# Patient Record
Sex: MALE | Race: WHITE | NOT HISPANIC OR LATINO | Employment: OTHER | URBAN - NONMETROPOLITAN AREA
[De-identification: names, ages, dates, MRNs, and addresses within clinical notes are randomized per-mention and may not be internally consistent; named-entity substitution may affect disease eponyms.]

---

## 2017-01-09 ENCOUNTER — CLINICAL SUPPORT (OUTPATIENT)
Dept: AUDIOLOGY | Facility: CLINIC | Age: 52
End: 2017-01-09

## 2017-01-09 DIAGNOSIS — Z46.1 ENCOUNTER FOR FITTING OR ADJUSTMENT OF HEARING AID: Primary | ICD-10-CM

## 2017-01-09 PROCEDURE — V5267 HEARING AID SUP/ACCESS/DEV: HCPCS | Performed by: AUDIOLOGIST

## 2017-01-09 PROCEDURE — V5266 BATTERY FOR HEARING DEVICE: HCPCS | Performed by: AUDIOLOGIST

## 2017-02-22 ENCOUNTER — CLINICAL SUPPORT (OUTPATIENT)
Dept: AUDIOLOGY | Facility: CLINIC | Age: 52
End: 2017-02-22

## 2017-02-22 DIAGNOSIS — H90.3 SENSORINEURAL HEARING LOSS, ASYMMETRICAL: Primary | ICD-10-CM

## 2017-02-22 PROCEDURE — V5267 HEARING AID SUP/ACCESS/DEV: HCPCS | Performed by: AUDIOLOGIST

## 2017-02-22 PROCEDURE — V5266 BATTERY FOR HEARING DEVICE: HCPCS | Performed by: AUDIOLOGIST

## 2017-02-23 NOTE — PROGRESS NOTES
HEARING AID CHECK    Name:  Ashwin Robles  :  1965  Age:  52 y.o.  Date of Evaluation:  2017      HISTORY    Reason for visit:  Ashwin Robles is seen today for a hearing aid check.  Patient reports his hearing aid is acting up again.  He just picked up his hearing aid from repair, and 2 hours later it spontaneously quit working.      Hearing aid history:  Patient is currently wearing a [place] (ITC) hearing aid in left ear(s).      OFFICE VISIT    During today's visit the aid appeared to be in good working condition.  However, due to the intermittent behavior described by the patient, it was decided to send the hearing aid back to [facility] for them to look at it again and call with a diagnosis.      At this time he also picked up hearing aid batteries and wax traps which should be billed to his Worker's Compensation.      It was a pleasure seeing Ashwin Robles in Audiology today.  It is a pleasure helping Mr. Robles with his amplification needs.             This document has been electronically signed by Kaitlynn Alaniz MS CCC-A on 2017 8:15 AM       Kaitlynn Alaniz MS CCC-A  Licensed Audiologist    For Billing and Coding:  The following charges should be billed to Worker's Compensation:   - Batteries used in Hearing Aids - $12.00   - Wax Traps - $9.00

## 2017-04-25 ENCOUNTER — OFFICE VISIT (OUTPATIENT)
Dept: AUDIOLOGY | Facility: CLINIC | Age: 52
End: 2017-04-25

## 2017-04-25 DIAGNOSIS — Z97.4 USES HEARING AID: Primary | ICD-10-CM

## 2017-06-22 ENCOUNTER — CLINICAL SUPPORT (OUTPATIENT)
Dept: AUDIOLOGY | Facility: CLINIC | Age: 52
End: 2017-06-22

## 2017-06-22 DIAGNOSIS — Z46.1 ENCOUNTER FOR FITTING OR ADJUSTMENT OF HEARING AID: Primary | ICD-10-CM

## 2017-06-22 PROCEDURE — V5267 HEARING AID SUP/ACCESS/DEV: HCPCS | Performed by: AUDIOLOGIST

## 2017-06-22 PROCEDURE — V5266 BATTERY FOR HEARING DEVICE: HCPCS | Performed by: AUDIOLOGIST

## 2017-06-23 NOTE — PROGRESS NOTES
During today's visit he purchased 1 package hearing aid batteries at $6.00 and 1 package wax baskets at $9.00. A total of $15.00 will be billed to worker's comp.     It was a pleasure seeing Ashwin Robles in Audiology today. It is a pleasure helping Mr. Robles with his amplification needs.

## 2017-06-23 NOTE — PROGRESS NOTES
HEARING AID CHECK    Name:  Ashwin Robles  :  1965  Age:  52 y.o.  Date of Evaluation:  2017      HISTORY    Reason for visit:  Ashwin Robles is seen today to purchase hearing aid supplies.       OFFICE VISIT    During today's visit he purchased 1 package hearing aid batteries at $6.00 and 1 package wax baskets at $9.00.  A total of $15.00 will be billed to worker's comp.    It was a pleasure seeing Ashwin Robles in Audiology today.  It is a pleasure helping Mr. Robles with his amplification needs.             This document has been electronically signed by Kaitlynn Alaniz MS CCC-A on 2017 4:34 PM       Kaitlynn Alaniz MS CCC-A  Licensed Audiologist    For Billing and Coding:    Hearing Aid Supplies / Accessories - $9.00   - Batteries - $6.00

## 2017-06-23 NOTE — PROGRESS NOTES
HEARING AID CHECK    Name:  Ashwin Robles  :  1965  Age:  52 y.o.  Date of Evaluation:  2017      HISTORY    Reason for visit:  Ashwin Robles is seen today to purchase hearing aid supplies.      OFFICE VISIT    During today's visit he purchased 1 package hearing aid batteries at $6.00 and 1 package wax baskets at $9.00. A total of $15.00 will be billed to worker's comp.     It was a pleasure seeing Ashwin Robles in Audiology today. It is a pleasure helping Mr. Robles with his amplification needs.               This document has been electronically signed by Kaitlynn Alaniz MS CCC-A on 2017 4:41 PM       Kaitlynn Alaniz MS CCC-A  Licensed Audiologist    For Billing and Coding:  The following should be billed to worker's compensation:   Hearing Aid Supplies / Accessories - $9.00   - Batteries - $6.00

## 2017-09-06 ENCOUNTER — CLINICAL SUPPORT (OUTPATIENT)
Dept: AUDIOLOGY | Facility: CLINIC | Age: 52
End: 2017-09-06

## 2017-09-06 DIAGNOSIS — Z46.1 ENCOUNTER FOR FITTING OR ADJUSTMENT OF HEARING AID: Primary | ICD-10-CM

## 2017-09-06 PROCEDURE — V5266 BATTERY FOR HEARING DEVICE: HCPCS | Performed by: AUDIOLOGIST

## 2017-09-07 NOTE — PROGRESS NOTES
Name:  Ashwin Robles  :  1965  Age:  52 y.o.  Date of Evaluation:  2017      HISTORY    Reason for visit:  Ashwin Robles is seen today to  hearing aid supplies.    OFFICE VISIT    During today's visit he purchased 5 packs of hearing aid batteries.    The cost of the batteries will be billed to workers compensation.            This document has been electronically signed by JOSEF Rollins on 2017 11:00 AM       JOSEF Rollins  Licensed Audiologist    For Billing and Coding:  This is to be billed to workers compensation.  Battery for use in hearing aid device  - $30 for 5 packs of batteries

## 2017-09-20 ENCOUNTER — OFFICE VISIT (OUTPATIENT)
Dept: SLEEP MEDICINE | Facility: HOSPITAL | Age: 52
End: 2017-09-20

## 2017-09-20 ENCOUNTER — CLINICAL SUPPORT (OUTPATIENT)
Dept: AUDIOLOGY | Facility: CLINIC | Age: 52
End: 2017-09-20

## 2017-09-20 VITALS
BODY MASS INDEX: 34.59 KG/M2 | OXYGEN SATURATION: 97 % | DIASTOLIC BLOOD PRESSURE: 76 MMHG | HEIGHT: 73 IN | WEIGHT: 261 LBS | HEART RATE: 83 BPM | SYSTOLIC BLOOD PRESSURE: 128 MMHG

## 2017-09-20 DIAGNOSIS — Z46.1 ENCOUNTER FOR FITTING OR ADJUSTMENT OF HEARING AID: Primary | ICD-10-CM

## 2017-09-20 DIAGNOSIS — G47.33 OBSTRUCTIVE SLEEP APNEA, ADULT: Primary | ICD-10-CM

## 2017-09-20 PROCEDURE — 99213 OFFICE O/P EST LOW 20 MIN: CPT | Performed by: INTERNAL MEDICINE

## 2017-09-20 PROCEDURE — V5267 HEARING AID SUP/ACCESS/DEV: HCPCS | Performed by: AUDIOLOGIST

## 2017-09-20 NOTE — PROGRESS NOTES
Sleep Clinic Follow Up    Date: 9/20/2017  Primary Care Physician: Arya Castanon MD      Interim History (1/3):  Since the last visit on 05/009/2016, patient has:      1)  RADHA - Has remained compliant with CPAP. He denies receiving no benefit from PAP therapy, mask and machine issues, dry mouth, headaches, pressures intolerance, or non-compliance. He denies abnormal dreams, sleep paralysis, hypnagogic hallucinations, or cataplexy symptoms.     PAP Data:  Time frame: 05/09/2016 - 09/19/2017   Compliance 100 %  PAP range : 10 cm H2O  Average 90% pressure: 10 cmH2O  Leak: 7 seconds   Average AHI 1.4 events/hr  Mask type: pillows  DME: [facility]    Bed time: 6186-2514 when wife works first shift, 1426-7306 when she works second shift  Sleep latency: 15-30 minutes  Number of times awakens during the night: 0  Wake time: 0530 or 0800  Estimated total sleep time at night: 5-6 hours  Caffeine intake: none  Alcohol intake: none  Nap time: 1-1.5 hours ~ 9 am with CPAP  Sleepiness with Driving: N/A - legally blind    [place] - 10      PMHx, FH, SH reviewed and pertinent changes are: unchanged from last office visit on 05/09/2016      REVIEW OF SYSTEMS:   Negative for chest pain, fever, chills, SOA, abdominal pain. Smoking: none      Exam (6-11/12):        Vitals:    09/20/17 0937   BP: 128/76   Pulse: 83   SpO2: 97%     Body mass index is 34.43 kg/(m^2).  Gen:  No distress, conversant, pleasant, appears stated age, alert, oriented  Eyes:   Anicteric sclera, moist conjunctiva, no lid lag     PERRLA, right strabismus, no vision in right eye, left hemianopsia in the left eye   Heent:   NC/AT    Oropharynx clear, Mallampati 4*    Hearing aid used in left ear  Neck:   Supple, FROM  Lungs:  Normal effort, non-labored breathing    Clear to auscultation    CV:  Normal S1/S2, without murmur    No lower extremity edema  ABD:  Soft, normal bowel sounds    Skin:  Normal tone, texture and turgor    Psych:  Appropriate affect  Neuro:  CN  2-12 intact        Past Medical History:   Diagnosis Date   • Anxiety    • Dysphagia    • Encounter for routine adult health examination    • Essential hypertension    • Hearing loss    • History of colonoscopy 05/28/2014    Internal & external hemorrhoids found.   • History of esophagogastroduodenoscopy 05/28/2014    EGD w/ tube 02810 (1) -  Esophagitis seen. Biopsy taken. Gastritis in stomach. Biopsy taken. Normal duodenum.   • History of nutritional disorder    • Hyperlipidemia    • Hypertensive disorder     will f/u    • Insomnia    • Nevus, non-neoplastic     Left side of his chin    • Obstructive sleep apnea of adult    • Osteoarthritis of multiple joints    • Pain in lower limb    • Routine general medical examination at a health care facility     annual well care exam   • Screening for malignant neoplasm of colon    • Tinnitus    • Vision impairment     Blindness AND/OR vision impairment level        Current Outpatient Prescriptions:   •  aspirin 81 MG tablet, Take 81 mg by mouth Daily., Disp: , Rfl:   •  atorvastatin (LIPITOR) 20 MG tablet, Take  by mouth. Take 1 tablet every night at bedtime., Disp: , Rfl:   •  cyclobenzaprine (FLEXERIL) 5 MG tablet, Take one pill qhs prn muscle relaxation, Disp: 30 tablet, Rfl: 0  •  lisinopril (PRINIVIL,ZESTRIL) 10 MG tablet, Take 10 mg by mouth Daily., Disp: , Rfl:       ASSESSMENT:     1. Obstructive sleep apnea (PSG on 02/29/2012, AHI of 11.5, on CPAP of 10 cm H2O), currently on 10 - well controlled      PLAN:  1. Continue CPAP as prescribed.   2. Script for CPAP supplies  2. Return to clinic in 1 year with compliance check unless sx change in the interim period.       This document has been electronically signed by Amando Kitchen MD on September 20, 2017         CC: Arya Castanon MD          No ref. provider found

## 2017-09-21 NOTE — PROGRESS NOTES
HEARING AID CHECK    Name:  Ashwin Robles  :  1965  Age:  52 y.o.  Date of Evaluation:  2017      HISTORY    Reason for visit:  Ashwin Robles is seen today to purchase hearing aid supplies.      OFFICE VISIT    During today's visit he purchased 3 packages of wax traps at $9.00 each for a total of $21.00.  This will be billed to his worker's compensation.    It was a pleasure seeing Ashwin Robles in Audiology today.  It is a pleasure helping Mr. Robles with his amplification needs.             This document has been electronically signed by Kaitlynn Alaniz MS CCC-A on 2017 9:35 AM       Kaitlynn Alaniz MS CCC-A  Licensed Audiologist    For Billing and Coding:  Please bill the following to Worker's Compensation:    Hearing Aid Supplies / Accessories - $21.00

## 2017-11-22 ENCOUNTER — CLINICAL SUPPORT (OUTPATIENT)
Dept: AUDIOLOGY | Facility: CLINIC | Age: 52
End: 2017-11-22

## 2017-11-22 DIAGNOSIS — Z46.1 ENCOUNTER FOR FITTING OR ADJUSTMENT OF HEARING AID: Primary | ICD-10-CM

## 2017-11-22 PROCEDURE — V5266 BATTERY FOR HEARING DEVICE: HCPCS | Performed by: AUDIOLOGIST

## 2018-01-17 ENCOUNTER — CLINICAL SUPPORT (OUTPATIENT)
Dept: AUDIOLOGY | Facility: CLINIC | Age: 53
End: 2018-01-17

## 2018-01-17 DIAGNOSIS — Z46.1 FITTING AND ADJUSTMENT OF HEARING AID: Primary | ICD-10-CM

## 2018-02-20 NOTE — PROGRESS NOTES
HEARING AID CHECK    Name:  Ashwin Robles  :  1965  Age:  53 y.o.  Date of Evaluation:  2018      HISTORY    Reason for visit:  Ashwin Robles is seen today to purchase hearing aid supplies.      OFFICE VISIT    During today's visit he purchased 5 packages of batteries at $6.00 each for a total of $30.00.  This amount will be billed to his worker's comp.  He will return for further assistance as necessary.      It was a pleasure seeing Ashwin Robles in Audiology today.  It is a pleasure helping Mr. Robles with his amplification needs.             This document has been electronically signed by Kaitlynn Alaniz MS CCC-A on 2018 8:16 AM       Kaitlynn Alaniz MS CCC-A  Licensed Audiologist    For Billing and Coding:  Please bill the following to Worker's Comp:    Batteries used in hearing device - $30.00

## 2018-04-05 NOTE — PROGRESS NOTES
HEARING AID CHECK    Name:  Ashwin Robles  :  1965  Age:  53 y.o.  Date of Evaluation:  2018      HISTORY    Reason for visit:  Ashwin Robles is seen today to purchase hearing aid supplies.      OFFICE VISIT    During today's visit he purchased 3 packages of hearing aid batteries at $6.00/pack for a total of $18.00.  This amount will be billed to worker's compensation.    It was a pleasure seeing Ashwin Robles in Audiology today.  It is a pleasure helping Mr. Robles with his amplification needs.             This document has been electronically signed by Kaitlynn Alaniz MS CCC-A on 2018 4:23 PM       Kaitlynn Alaniz MS CCC-A  Licensed Audiologist    For Billing and Coding:  Please bill the following to Worker's Compensation:   Batteries for use in hearing device - $18.00

## 2018-06-12 ENCOUNTER — CLINICAL SUPPORT (OUTPATIENT)
Dept: AUDIOLOGY | Facility: CLINIC | Age: 53
End: 2018-06-12

## 2018-06-12 DIAGNOSIS — Z46.1 ENCOUNTER FOR FITTING OR ADJUSTMENT OF HEARING AID: Primary | ICD-10-CM

## 2018-06-12 PROCEDURE — V5267 HEARING AID SUP/ACCESS/DEV: HCPCS | Performed by: AUDIOLOGIST

## 2018-06-12 PROCEDURE — V5266 BATTERY FOR HEARING DEVICE: HCPCS | Performed by: AUDIOLOGIST

## 2018-06-13 NOTE — PROGRESS NOTES
HEARING AID CHECK    Name:  Ashwin Robles  :  1965  Age:  53 y.o.  Date of Evaluation:  2018      HISTORY    Reason for visit:  Ashwin Robles is seen today to purchase supplies for his hearing aids      OFFICE VISIT    During today's visit he purchased 5 packs of batteries at $6.00 each and 3 packs of wax traps at $9.00 each.  The total for this purchase is $57.00.  This amount will be billed to his worker's compensation.    It was a pleasure seeing Ashwin Robles in Audiology today.  It is a pleasure helping Mr. Robles with his amplification needs.             This document has been electronically signed by Kaitlynn Alaniz MS CCC-A on 2018 10:21 AM       Kaitlynn Alaniz MS CCC-A  Licensed Audiologist    For Billing and Coding:  Please bill the following to Worker's Compensation:    Hearing Aid Supplies / Accessories - $57.00

## 2018-06-30 ENCOUNTER — HOSPITAL ENCOUNTER (EMERGENCY)
Facility: HOSPITAL | Age: 53
Discharge: ANOTHER HOSPITAL | End: 2018-07-01
Attending: EMERGENCY MEDICINE | Admitting: EMERGENCY MEDICINE

## 2018-06-30 ENCOUNTER — APPOINTMENT (OUTPATIENT)
Dept: CT IMAGING | Facility: HOSPITAL | Age: 53
End: 2018-06-30

## 2018-06-30 DIAGNOSIS — S09.90XA INJURY OF HEAD, INITIAL ENCOUNTER: Primary | ICD-10-CM

## 2018-06-30 PROCEDURE — 99284 EMERGENCY DEPT VISIT MOD MDM: CPT

## 2018-06-30 PROCEDURE — 70450 CT HEAD/BRAIN W/O DYE: CPT

## 2018-06-30 RX ORDER — PHENTERMINE HYDROCHLORIDE 30 MG/1
30 CAPSULE ORAL EVERY MORNING
COMMUNITY
End: 2018-10-24

## 2018-07-01 VITALS
HEART RATE: 76 BPM | SYSTOLIC BLOOD PRESSURE: 136 MMHG | TEMPERATURE: 97.6 F | BODY MASS INDEX: 33 KG/M2 | DIASTOLIC BLOOD PRESSURE: 83 MMHG | OXYGEN SATURATION: 94 % | RESPIRATION RATE: 20 BRPM | WEIGHT: 249 LBS | HEIGHT: 73 IN

## 2018-07-01 NOTE — ED PROVIDER NOTES
"Subjective   History of Present Illness  Patient is a 53-year-old male who presents via EMS today.  Apparently simvastatin and hit his head on some sheet rock.  He did not have loss of consciousness although he said he was dazed.  He went home and he felt like his vision had changed.  He had a closed head injury back in late 80s and was treated [facility].  Apparently he had craniotomy and a \"plate\" placed in his head.  He has not seen anybody since then.  At baseline he has light perception in his right eye.  Left eye he has a lateral vertical visual field defect.  He says it is from 12:00 to  6:00.  Review of Systems   Constitutional: Negative for activity change, appetite change, chills, diaphoresis, fatigue and fever.   Respiratory: Negative for apnea, cough, choking, chest tightness, shortness of breath, wheezing and stridor.    Cardiovascular: Negative for chest pain, palpitations and leg swelling.   Neurological: Positive for light-headedness and headaches. Negative for dizziness, tremors, seizures, syncope, facial asymmetry, speech difficulty, weakness and numbness.   All other systems reviewed and are negative.      Past Medical History:   Diagnosis Date   • Anxiety    • Dysphagia    • Encounter for routine adult health examination    • Essential hypertension    • Hearing loss    • History of colonoscopy 05/28/2014    Internal & external hemorrhoids found.   • History of esophagogastroduodenoscopy 05/28/2014    EGD w/ tube 92470 (1) -  Esophagitis seen. Biopsy taken. Gastritis in stomach. Biopsy taken. Normal duodenum.   • History of nutritional disorder    • Hyperlipidemia    • Hypertensive disorder     will f/u    • Insomnia    • Nevus, non-neoplastic     Left side of his chin    • Obstructive sleep apnea of adult    • Osteoarthritis of multiple joints    • Pain in lower limb    • Routine general medical examination at a health care facility     annual well care exam   • Screening for malignant neoplasm " of colon    • Tinnitus    • Vision impairment     Blindness AND/OR vision impairment level        Allergies   Allergen Reactions   • Codeine        Past Surgical History:   Procedure Laterality Date   • BRAIN SURGERY     • EXTERNAL EAR SURGERY         Family History   Problem Relation Age of Onset   • Cancer Other    • Diabetes Other    • Hyperlipidemia Other    • Hypertension Other    • Osteoarthritis Other    • Stroke Other    • Hearing loss Other        Social History     Social History   • Marital status:      Social History Main Topics   • Smoking status: Former Smoker   • Smokeless tobacco: Former User   • Alcohol use No   • Drug use: No     Other Topics Concern   • Not on file           Objective   Physical Exam   Constitutional: He is oriented to person, place, and time. He appears well-developed and well-nourished. No distress.   RTS 12 GCS 15   HENT:   Head: Normocephalic and atraumatic.   Mouth/Throat: Oropharynx is clear and moist.   Eyes: Conjunctivae and EOM are normal. Pupils are equal, round, and reactive to light.   OD light perception only.  Pupil reactive.    OS lateral visual field defect.  Recent says he is at baseline.  Visual acuity is clear.   Cardiovascular: Normal rate, regular rhythm and normal heart sounds.    Neurological: He is alert and oriented to person, place, and time. No sensory deficit. He exhibits normal muscle tone. Coordination normal.   Psychiatric: He has a normal mood and affect. His behavior is normal. Judgment and thought content normal.   Nursing note and vitals reviewed.      Procedures           ED Course      Lab work and imaging reviewed.  No acute finding on CT.  However this patient did have some visual disturbance.  May just be a concussion however the patient has very little visual reserve.  For this reason I have contacted [facility] in [facility] for transfer.  I spoke with Dr. Arango in the ER will accept.            [facility]      Final diagnoses:    Injury of head, initial encounter            Nino Jerry MD  06/30/18 2209       Nino Jerry MD  06/30/18 2200

## 2018-09-20 ENCOUNTER — OFFICE VISIT (OUTPATIENT)
Dept: SLEEP MEDICINE | Facility: HOSPITAL | Age: 53
End: 2018-09-20

## 2018-09-20 ENCOUNTER — CLINICAL SUPPORT (OUTPATIENT)
Dept: AUDIOLOGY | Facility: CLINIC | Age: 53
End: 2018-09-20

## 2018-09-20 VITALS
DIASTOLIC BLOOD PRESSURE: 93 MMHG | HEART RATE: 76 BPM | OXYGEN SATURATION: 97 % | BODY MASS INDEX: 33.53 KG/M2 | SYSTOLIC BLOOD PRESSURE: 143 MMHG | WEIGHT: 253 LBS | HEIGHT: 73 IN

## 2018-09-20 DIAGNOSIS — G47.33 OBSTRUCTIVE SLEEP APNEA, ADULT: Primary | ICD-10-CM

## 2018-09-20 DIAGNOSIS — Z46.1 ENCOUNTER FOR FITTING OR ADJUSTMENT OF HEARING AID: Primary | ICD-10-CM

## 2018-09-20 PROCEDURE — V5266 BATTERY FOR HEARING DEVICE: HCPCS | Performed by: AUDIOLOGIST

## 2018-09-20 PROCEDURE — 99213 OFFICE O/P EST LOW 20 MIN: CPT | Performed by: INTERNAL MEDICINE

## 2018-09-20 NOTE — PROGRESS NOTES
Sleep Clinic Follow Up    Date: 9/20/2018  Primary Care Physician: Angle Sanchez MD      Interim History (1/3):  Since the last visit on 09/20/2017, patient has:      1)  RADHA - Has remained compliant with CPAP. He denies mask and machine issues, dry mouth, headaches, pressures intolerance, or non-compliance. He denies abnormal dreams, sleep paralysis, nasal congestion, URI sx.    PAP Data:    Time frame: 09/20/2017 - 09/19/2018   Compliance 100 %  Average use on days used: 7hrs 6 min  Percent of days with usage greater than or equal to 4 hours: 98.9%  PAP range : 10 cm H2O  Average 90% pressure: 10 cmH2O  Leak: < 1 minutes  Average AHI 1.6 events/hr  Mask type: pillows  DME: [facility]      Bed time: 2330  Sleep latency: 5-20 minutes  Number of times awakens during the night: 0  Wake time: 0700  Estimated total sleep time at night: 6-8 hours  Caffeine intake: 0oz of coffee, 0oz of tea, and 0oz of soda  Alcohol intake: 0 drinks per week  Nap time: twice a week for 1 hour   Sleepiness with Driving: N/A  Complex partial blindness    [place] - 5    2) Patient denies RLS symptoms.     PMHx, FH, SH reviewed and pertinent changes are: unchanged from last office visit on 09/20/2017      REVIEW OF SYSTEMS:   Negative for chest pain, fever, chills, SOA, abdominal pain. Smoking: none      Exam (6-11/12):    Vitals:    09/20/18 0952   BP: 143/93   Pulse: 76   SpO2: 97%           Body mass index is 33.38 kg/m². Patient's Body mass index is 33.38 kg/m². BMI is above normal parameters. Recommendations include: referral to primary care.      Gen:  No distress, conversant, pleasant, appears stated age, alert, oriented  Eyes:   Anicteric sclera, moist conjunctiva, no lid lag     PERRLA, EOM with lag, partial blindness  Heent:   NC/AT    Oropharynx clear, Mallampati 4    Reduced hearing  Lungs:  Normal effort, non-labored breathing    Clear to auscultation    CV:  Normal S1/S2, no murmur    no lower extremity edema  ABD:  Soft, normal  bowel sounds    Psych:  Appropriate affect  Neuro:  CN 2-12 intact    Past Medical History:   Diagnosis Date   • Anxiety    • Dysphagia    • Encounter for routine adult health examination    • Essential hypertension    • Hearing loss    • History of colonoscopy 2014    Internal & external hemorrhoids found.   • History of esophagogastroduodenoscopy 2014    EGD w/ tube 14723 (1) -  Esophagitis seen. Biopsy taken. Gastritis in stomach. Biopsy taken. Normal duodenum.   • History of nutritional disorder    • Hyperlipidemia    • Hypertensive disorder     will f/u    • Insomnia    • Nevus, non-neoplastic     Left side of his chin    • Obstructive sleep apnea of adult    • Osteoarthritis of multiple joints    • Pain in lower limb    • Routine general medical examination at a health care facility     annual well care exam   • Screening for malignant neoplasm of colon    • Tinnitus    • Vision impairment     Blindness AND/OR vision impairment level        Current Outpatient Prescriptions:   •  aspirin 81 MG tablet, Take 81 mg by mouth Daily., Disp: , Rfl:   •  atorvastatin (LIPITOR) 20 MG tablet, Take  by mouth. Take 1 tablet every night at bedtime., Disp: , Rfl:   •  lisinopril (PRINIVIL,ZESTRIL) 10 MG tablet, Take 10 mg by mouth Daily., Disp: , Rfl:   •  phentermine 30 MG capsule, Take 30 mg by mouth Every Morning., Disp: , Rfl:     Sleep Testin. PSG on 2012, AHI of 11.5   2. Currently on 10 cm H2O    ASSESSMENT / PLAN:     1. Obstructive sleep apnea  1. Continue PAP as prescribed.   2. Script for PAP supplies  3. Return to clinic in 1 year with compliance check unless sx change in the interim period.    Total time 15 min, more than half spent in face to face counseling and coordination of care.    RTC in 12 months     This document has been electronically signed by Amando Kitchen MD on 2018         CC: Angle Sanchez MD          No ref. provider found

## 2018-09-20 NOTE — PROGRESS NOTES
HEARING AID CHECK    Name:  Ashwin Robles  :  1965  Age:  53 y.o.  Date of Evaluation:  2018      HISTORY    Reason for visit:  Ashwin Robles is seen today to purchase hearing aid supplies.      OFFICE VISIT    During today's visit he purchased 5 packs of hearing aid batteries at $6.00 per pack for a total of $30.00.  Total charges will be billed to his worker's compensation.  He will return for hearing aid assistance as necessary.      It was a pleasure seeing Ashwin Robles in Audiology today.  It is a pleasure helping Mr. Robles with his amplification needs.             This document has been electronically signed by Kaitlynn Alaniz MS CCC-A on 2018 3:33 PM       Kaitlynn Alaniz MS CCC-A  Licensed Audiologist    For Billing and Coding:  Please bill the following to Worker's Compensation:    Hearing Aid Supplies / Accessories - $30.00

## 2018-10-24 PROBLEM — G89.29 CHRONIC BILATERAL LOW BACK PAIN WITHOUT SCIATICA: Status: ACTIVE | Noted: 2018-10-24

## 2018-10-24 PROBLEM — M51.36 DEGENERATIVE DISC DISEASE, LUMBAR: Status: ACTIVE | Noted: 2018-10-24

## 2018-10-24 PROBLEM — M54.50 CHRONIC BILATERAL LOW BACK PAIN WITHOUT SCIATICA: Status: ACTIVE | Noted: 2018-10-24

## 2019-03-29 ENCOUNTER — CLINICAL SUPPORT (OUTPATIENT)
Dept: AUDIOLOGY | Facility: CLINIC | Age: 54
End: 2019-03-29

## 2019-03-29 DIAGNOSIS — Z46.1 ENCOUNTER FOR FITTING OR ADJUSTMENT OF HEARING AID: Primary | ICD-10-CM

## 2019-03-29 PROCEDURE — HEARINGNOCHG: Performed by: AUDIOLOGIST

## 2019-04-10 ENCOUNTER — OFFICE VISIT (OUTPATIENT)
Dept: SLEEP MEDICINE | Facility: HOSPITAL | Age: 54
End: 2019-04-10

## 2019-04-10 VITALS
BODY MASS INDEX: 35.92 KG/M2 | OXYGEN SATURATION: 96 % | WEIGHT: 271 LBS | HEART RATE: 88 BPM | HEIGHT: 73 IN | SYSTOLIC BLOOD PRESSURE: 146 MMHG | DIASTOLIC BLOOD PRESSURE: 88 MMHG

## 2019-04-10 DIAGNOSIS — G47.33 OBSTRUCTIVE SLEEP APNEA, ADULT: Primary | ICD-10-CM

## 2019-04-10 PROCEDURE — 99213 OFFICE O/P EST LOW 20 MIN: CPT | Performed by: INTERNAL MEDICINE

## 2019-04-10 NOTE — PROGRESS NOTES
Sleep Clinic Follow Up    Date: 4/10/2019  Primary Care Physician: Angle Sanchez MD    Last office visit: 2018 (I reviewed this note)    CC: Follow up: RADHA    Sleep Testin. PSG on 2012, AHI of 11.5   2. Currently on 10-15 cm H2O    Assessment and Plan:    1. Obstructive sleep apnea Established, stable (1)  1. On a loaner - needs new machine  2. Compliant and improved with PAP therapy  3. Continue PAP as prescribed.   4. RTC in 31-90 days    Interim History:  Since the last visit:    1) mild RADHA -  Ashwin Robles has remained compliant with CPAP. He denies mask issues, dry mouth, headaches, or pressures intolerance. He denies abnormal dreams, sleep paralysis, nasal congestion, URI sx. He machine recently stopped working, but he is using loaner.    PAP Data:    Time frame: 2019 - 2019   Compliance 100 %  Average use on days used: 8hrs 13 min  Percent of days with usage greater than or equal to 4 hours: 000%  PAP range : 10 cm H2O  Average 90% pressure: 10 cmH2O  Leak: <1  minutes  Average AHI 2.2 events/hr  Mask type: Nasal pillows  DME: [facility]    Bed time: 2330  Sleep latency: 5 minutes  Number of times awakens during the night: 0  Wake time: 0530  Estimated total sleep time at night: 5-6 hours  Caffeine intake: 0oz of coffee, 24oz of tea, and 0oz of soda  Alcohol intake: 0 drinks per week  Nap time: none   Sleepiness with Driving: none    Holiday - 3    2) Patient denies RLS symptoms.     PMHx, FH, SH reviewed and pertinent changes are: unchanged from last office visit on 2018      REVIEW OF SYSTEMS:   Negative for chest pain, fever, cough, SOA, abdominal pain. Smoking:none      Exam:  Vitals:    04/10/19 0917   BP: 146/88   Pulse: 88   SpO2: 96%           04/10/19  0917   Weight: 123 kg (271 lb)     Body mass index is 35.75 kg/m². Patient's Body mass index is 35.75 kg/m². BMI is above normal parameters. Recommendations include: referral to primary care.      Gen:  No  acute distress, alert, oriented  Lungs:  CTA with normal effort   CV:  RRR, no M/R/G  GI:  soft, non-tender  Psych:  Appropriate affect    Past Medical History:   Diagnosis Date   • Anxiety    • Dysphagia    • Encounter for routine adult health examination    • Essential hypertension    • Hearing loss    • History of colonoscopy 05/28/2014    Internal & external hemorrhoids found.   • History of esophagogastroduodenoscopy 05/28/2014    EGD w/ tube 03871 (1) -  Esophagitis seen. Biopsy taken. Gastritis in stomach. Biopsy taken. Normal duodenum.   • History of nutritional disorder    • Hyperlipidemia    • Hypertensive disorder     will f/u    • Insomnia    • Nevus, non-neoplastic     Left side of his chin    • Obstructive sleep apnea of adult    • Osteoarthritis of multiple joints    • Pain in lower limb    • Routine general medical examination at a health care facility     annual well care exam   • Screening for malignant neoplasm of colon    • Tinnitus    • Vision impairment     Blindness AND/OR vision impairment level        Current Outpatient Medications:   •  aspirin 81 MG tablet, Take 81 mg by mouth Daily., Disp: , Rfl:   •  atorvastatin (LIPITOR) 20 MG tablet, Take  by mouth. Take 1 tablet every night at bedtime., Disp: , Rfl:   •  cyclobenzaprine (FLEXERIL) 5 MG tablet, Take 1 tablet by mouth 3 (Three) Times a Day As Needed for Muscle Spasms., Disp: 60 tablet, Rfl: 0  •  lisinopril (PRINIVIL,ZESTRIL) 10 MG tablet, Take 10 mg by mouth Daily., Disp: , Rfl:   •  predniSONE (DELTASONE) 10 MG tablet, Take one tablet by mouth x 3 day then 0.5 tablet by mouth daily x 4 days., Disp: 5 tablet, Rfl: 1    Total time 15 min, more than half spent in face to face counseling and coordination of care.    Patient to follow up in 31-90 days with compliance report     This document has been electronically signed by Amando Kitchen MD on April 10, 2019         CC: Angle Sanchez MD          No ref. provider found

## 2019-04-16 ENCOUNTER — CLINICAL SUPPORT (OUTPATIENT)
Dept: AUDIOLOGY | Facility: CLINIC | Age: 54
End: 2019-04-16

## 2019-04-16 DIAGNOSIS — Z46.1 ENCOUNTER FOR FITTING OR ADJUSTMENT OF HEARING AID: Primary | ICD-10-CM

## 2019-04-16 PROCEDURE — V5014 HEARING AID REPAIR/MODIFYING: HCPCS | Performed by: AUDIOLOGIST

## 2019-04-17 NOTE — PROGRESS NOTES
HEARING AID CHECK    Name:  Ashwin Robles  :  1965  Age:  54 y.o.  Date of Evaluation:  2019      HISTORY    Reason for visit:  Ashwin Robles is seen today to  his hearing aid which has been out for repair.    Hearing aid history:  Patient is currently wearing a In the Canal (ITC) hearing aid in left ear(s).     OFFICE VISIT    During today's visit the hearing aid repair was returned to patient in good working condition.  His new repair warranty with [employer] extends through 2020.  He will return for hearing aid assistance as necessary.  His Worker's Compensation will be billed $280.00 for the cost of the repair.      It was a pleasure seeing Ashwin Robles in Audiology today.  It is a pleasure helping Mr. Robles with his amplification needs.             This document has been electronically signed by Kaitlynn Alaniz MS CCC-A on 2019 1:45 PM       Kaitlynn Alaniz MS CCC-A  Licensed Audiologist    For Billing and Coding:  Please bill the following to Worker's Compensation:    Hearing Aid Repairs / Modifications - $280.00

## 2019-04-29 ENCOUNTER — CLINICAL SUPPORT (OUTPATIENT)
Dept: AUDIOLOGY | Facility: CLINIC | Age: 54
End: 2019-04-29

## 2019-04-29 DIAGNOSIS — Z46.1 ENCOUNTER FOR FITTING OR ADJUSTMENT OF HEARING AID: Primary | ICD-10-CM

## 2019-04-29 PROCEDURE — V5266 BATTERY FOR HEARING DEVICE: HCPCS | Performed by: AUDIOLOGIST

## 2019-04-29 PROCEDURE — V5267 HEARING AID SUP/ACCESS/DEV: HCPCS | Performed by: AUDIOLOGIST

## 2019-05-16 ENCOUNTER — CONSULT (OUTPATIENT)
Dept: GASTROENTEROLOGY | Facility: CLINIC | Age: 54
End: 2019-05-16

## 2019-05-16 VITALS
HEIGHT: 73 IN | HEART RATE: 79 BPM | SYSTOLIC BLOOD PRESSURE: 140 MMHG | WEIGHT: 262.4 LBS | DIASTOLIC BLOOD PRESSURE: 80 MMHG | BODY MASS INDEX: 34.78 KG/M2 | OXYGEN SATURATION: 94 %

## 2019-05-16 DIAGNOSIS — R13.10 DYSPHAGIA, UNSPECIFIED TYPE: Primary | ICD-10-CM

## 2019-05-16 DIAGNOSIS — Z12.11 ENCOUNTER FOR SCREENING FOR MALIGNANT NEOPLASM OF COLON: ICD-10-CM

## 2019-05-16 PROCEDURE — 99203 OFFICE O/P NEW LOW 30 MIN: CPT | Performed by: INTERNAL MEDICINE

## 2019-05-16 RX ORDER — AMITRIPTYLINE HYDROCHLORIDE 25 MG/1
TABLET, FILM COATED ORAL
Refills: 5 | COMMUNITY
Start: 2019-05-09 | End: 2021-01-19

## 2019-05-16 RX ORDER — SODIUM, POTASSIUM,MAG SULFATES 17.5-3.13G
1 SOLUTION, RECONSTITUTED, ORAL ORAL EVERY 12 HOURS
Qty: 1 BOTTLE | Refills: 0 | Status: SHIPPED | OUTPATIENT
Start: 2019-05-16 | End: 2019-06-11 | Stop reason: HOSPADM

## 2019-05-16 RX ORDER — NAPROXEN SODIUM 220 MG
220 TABLET ORAL 2 TIMES DAILY PRN
COMMUNITY
End: 2021-01-19

## 2019-05-16 RX ORDER — DEXTROSE AND SODIUM CHLORIDE 5; .45 G/100ML; G/100ML
30 INJECTION, SOLUTION INTRAVENOUS CONTINUOUS PRN
Status: CANCELLED | OUTPATIENT
Start: 2019-06-11

## 2019-05-16 NOTE — PROGRESS NOTES
[facility]      Chief Complaint:   Chief Complaint   Patient presents with   • Colon Cancer Screening     recall letter       Subjective     HPI:   Patient with history of esophageal stricture.  Patient states that his swallowing has become much worse over the past few months.  Patient has had dilatations in the past.  Severe at times usually occurs with meat products.  Patient has not had a colonoscopy in greater than 5 years.  Patient denies any change in bowel habits.  Patient is over the age of 49.    Plan; we will schedule patient for EGD and colonoscopy to evaluate    Past Medical History:   Past Medical History:   Diagnosis Date   • Anxiety    • Dysphagia    • Encounter for routine adult health examination    • Essential hypertension    • Hearing loss    • History of colonoscopy 05/28/2014    Internal & external hemorrhoids found.   • History of esophagogastroduodenoscopy 05/28/2014    EGD w/ tube 68513 (1) -  Esophagitis seen. Biopsy taken. Gastritis in stomach. Biopsy taken. Normal duodenum.   • History of nutritional disorder    • Hyperlipidemia    • Hypertensive disorder     will f/u    • Insomnia    • Nevus, non-neoplastic     Left side of his chin    • Obstructive sleep apnea of adult    • Osteoarthritis of multiple joints    • Pain in lower limb    • Routine general medical examination at a health care facility     annual well care exam   • Screening for malignant neoplasm of colon    • Tinnitus    • Vision impairment     Blindness AND/OR vision impairment level        Past Surgical History:  Past Surgical History:   Procedure Laterality Date   • BRAIN SURGERY     • ELBOW PROCEDURE     • EXTERNAL EAR SURGERY         Family History:  Family History   Problem Relation Age of Onset   • Cancer Other    • Diabetes Other    • Hyperlipidemia Other    • Hypertension Other    • Osteoarthritis Other    • Stroke Other    • Hearing loss Other        Social History:   reports that he has  quit smoking. He has quit using smokeless tobacco. He reports that he does not drink alcohol or use drugs.    Medications:   Prior to Admission medications    Medication Sig Start Date End Date Taking? Authorizing Provider   amitriptyline (ELAVIL) 25 MG tablet TAKE 1-2 BY MOUTH TABLET AT BEDTIME 5/9/19  Yes Carolyn Domingo MD   aspirin 81 MG tablet Take 81 mg by mouth Daily.   Yes Carolyn Domingo MD   atorvastatin (LIPITOR) 20 MG tablet Take  by mouth. Take 1 tablet every night at bedtime.   Yes Carolyn Domingo MD   cyclobenzaprine (FLEXERIL) 5 MG tablet Take 1 tablet by mouth 3 (Three) Times a Day As Needed for Muscle Spasms. 10/24/18  Yes Esthela Carter APRN   lisinopril (PRINIVIL,ZESTRIL) 10 MG tablet Take 10 mg by mouth Daily.   Yes Carolyn Domingo MD   naproxen sodium (ALEVE) 220 MG tablet Take 220 mg by mouth 2 (Two) Times a Day As Needed.   Yes Carolyn Domingo MD   Omega 3-6-9 Fatty Acids (TRIPLE OMEGA COMPLEX PO) Take  by mouth.   Yes Carolyn Domingo MD   sodium-potassium-magnesium sulfates (SUPREP) 17.5-3.13-1.6 GM/177ML solution oral solution Take 1 bottle by mouth Every 12 (Twelve) Hours. 5/16/19   Kartik Cole MD   predniSONE (DELTASONE) 10 MG tablet Take one tablet by mouth x 3 day then 0.5 tablet by mouth daily x 4 days. 10/24/18 5/16/19  Esthela Carter APRN       Allergies:  Codeine    ROS:    Review of Systems   Constitutional: Negative for activity change, appetite change and unexpected weight change.   HENT: Positive for trouble swallowing. Negative for congestion and sore throat.    Respiratory: Negative for cough, choking and shortness of breath.    Cardiovascular: Negative for chest pain.   Gastrointestinal: Negative for abdominal distention, abdominal pain, anal bleeding, blood in stool, constipation, diarrhea, nausea, rectal pain and vomiting.   Endocrine: Negative for heat intolerance, polydipsia and polyphagia.   Genitourinary: Negative for  "difficulty urinating.   Musculoskeletal: Negative for arthralgias.   Skin: Negative for color change, pallor, rash and wound.   Allergic/Immunologic: Negative for food allergies.   Neurological: Negative for dizziness, syncope, weakness and headaches.   Psychiatric/Behavioral: Negative for agitation, behavioral problems, confusion and decreased concentration.     Objective     Blood pressure 140/80, pulse 79, height 185.4 cm (73\"), weight 119 kg (262 lb 6.4 oz), SpO2 94 %.    Physical Exam   Constitutional: He is oriented to person, place, and time. He appears well-developed and well-nourished. No distress.   HENT:   Head: Normocephalic and atraumatic.   Cardiovascular: Normal rate, regular rhythm, normal heart sounds and intact distal pulses. Exam reveals no gallop and no friction rub.   No murmur heard.  Pulmonary/Chest: Breath sounds normal. No respiratory distress. He has no wheezes. He has no rales. He exhibits no tenderness.   Abdominal: Soft. Bowel sounds are normal. He exhibits no distension and no mass. There is no tenderness. There is no rebound and no guarding. No hernia.   Musculoskeletal: Normal range of motion. He exhibits no edema.   Neurological: He is alert and oriented to person, place, and time.   Skin: Skin is warm and dry. No rash noted. He is not diaphoretic. No erythema. No pallor.   Psychiatric: He has a normal mood and affect. His behavior is normal. Judgment and thought content normal.        Assessment/Plan   Ashwin was seen today for colon cancer screening.    Diagnoses and all orders for this visit:    Dysphagia, unspecified type  -     Case Request; Standing  -     dextrose 5 % and sodium chloride 0.45 % infusion  -     Case Request    Encounter for screening for malignant neoplasm of colon  -     Case Request; Standing  -     dextrose 5 % and sodium chloride 0.45 % infusion  -     Case Request    Other orders  -     Follow Anesthesia Guidelines / Standing Orders; Future  -     Obtain " Informed Consent; Future  -     Implement Anesthesia Orders Day of Procedure; Standing  -     Obtain Informed Consent; Standing  -     POC Glucose Once; Standing  -     Insert Peripheral IV; Standing  -     sodium-potassium-magnesium sulfates (SUPREP) 17.5-3.13-1.6 GM/177ML solution oral solution; Take 1 bottle by mouth Every 12 (Twelve) Hours.        ESOPHAGOGASTRODUODENOSCOPY (N/A), COLONOSCOPY (N/A)     Diagnosis Plan   1. Dysphagia, unspecified type  Case Request    dextrose 5 % and sodium chloride 0.45 % infusion    Case Request   2. Encounter for screening for malignant neoplasm of colon  Case Request    dextrose 5 % and sodium chloride 0.45 % infusion    Case Request       Anticipated Surgical Procedure:  Orders Placed This Encounter   Procedures   • Follow Anesthesia Guidelines / Standing Orders     Standing Status:   Future   • Obtain Informed Consent     Standing Status:   Future     Order Specific Question:   Informed Consent Given For     Answer:   egd and colonoscopy       The risks, benefits, and alternatives of this procedure have been discussed with the patient or the responsible party- the patient understands and agrees to proceed.

## 2019-05-16 NOTE — PATIENT INSTRUCTIONS

## 2019-06-05 RX ORDER — AMITRIPTYLINE HYDROCHLORIDE 25 MG/1
25 TABLET, FILM COATED ORAL NIGHTLY
COMMUNITY
End: 2021-01-19

## 2019-06-06 ENCOUNTER — OFFICE VISIT (OUTPATIENT)
Dept: SLEEP MEDICINE | Facility: HOSPITAL | Age: 54
End: 2019-06-06

## 2019-06-06 VITALS
DIASTOLIC BLOOD PRESSURE: 94 MMHG | SYSTOLIC BLOOD PRESSURE: 162 MMHG | WEIGHT: 265 LBS | OXYGEN SATURATION: 97 % | HEART RATE: 92 BPM | BODY MASS INDEX: 35.12 KG/M2 | HEIGHT: 73 IN

## 2019-06-06 DIAGNOSIS — G47.33 OBSTRUCTIVE SLEEP APNEA, ADULT: Primary | ICD-10-CM

## 2019-06-06 PROCEDURE — 99213 OFFICE O/P EST LOW 20 MIN: CPT | Performed by: NURSE PRACTITIONER

## 2019-06-06 NOTE — PROGRESS NOTES
Sleep Clinic Follow Up    Date: 2019  Primary Care Physician: Angle Sanchez MD    Last office visit: 04/10/2019 (I reviewed this note)    CC: Follow up: RADHA on CPAP      Interim History:  Since the last visit:    1) mild RADHA -  Ashwin Robles has remained compliant with CPAP. He denies mask and machine issues, dry mouth, headaches, or pressures intolerance. He denies abnormal dreams, sleep paralysis, nasal congestion, URI sx.  Patient received a new CPAP machine and is here for a compliance check.     Sleep Testin. PSG on 2012, AHI of 11.5   2. Currently on 10-20 cm H2O    PAP Data:    Time frame: 2019-2019   Compliance 87.8 %  Average use on days used: 8 hrs 12 min  Percent of days with usage greater than or equal to 4 hours: 86.7%  PAP range : 10-20 cm H2O  Average 90% pressure: 12.4 cmH2O  Leak: 5 seconds   Average AHI 2.2 events/hr  Mask type: Nasal pillows  DME: [facility]    Bed time: 2330  Sleep latency: 5 minutes  Number of times awakens during the night: 0  Wake time: 0530  Estimated total sleep time at night: 5-6 hours  Caffeine intake: 0 cups of coffee, 2cups of tea, and 0 oz of soda  Alcohol intake: 0 drinks per week  Nap time: none   Sleepiness with Driving: none     [place] - 3    2) Patient denies RLS symptoms.     PMHx, FH, SH reviewed and pertinent changes are:  unchanged from last office visit on 04/10/2019. Having colonoscopy next week.      REVIEW OF SYSTEMS:   Negative for chest pain, fever, cough, SOA, abdominal pain. Smoking:none        Exam:  Vitals:    19 1058   BP: 162/94   Pulse: 92   SpO2: 97%           19  1058   Weight: 120 kg (265 lb)     Body mass index is 34.97 kg/m². Patient's Body mass index is 34.97 kg/m². BMI is above normal parameters. Recommendations include: referral to primary care.      Gen:                No distress, conversant, pleasant, appears stated age, alert, oriented  Eyes:               Anicteric sclera, moist  conjunctiva, no lid lag                           PERRL, EOMI   Heent:             NC/AT                          Oropharynx clear                          Normal hearing  Lungs:             Normal effort, non-labored breathing                          Clear to auscultation bilaterally          CV:                  Normal S1/S2, no murmur                          No lower extremity edema  ABD:               Soft, rounded, non-distended                          Normal bowel sounds                    Psych:             Appropriate affect  Neuro:             CN 2-12 appear intact    Past Medical History:   Diagnosis Date   • Anxiety    • Dysphagia    • Encounter for routine adult health examination    • Essential hypertension    • Hearing loss    • History of colonoscopy 05/28/2014    Internal & external hemorrhoids found.   • History of esophagogastroduodenoscopy 05/28/2014    EGD w/ tube 17544 (1) -  Esophagitis seen. Biopsy taken. Gastritis in stomach. Biopsy taken. Normal duodenum.   • History of nutritional disorder    • Hyperlipidemia    • Hypertensive disorder     will f/u    • Insomnia    • Nevus, non-neoplastic     Left side of his chin    • Obstructive sleep apnea of adult    • Osteoarthritis of multiple joints    • Pain in lower limb    • Routine general medical examination at a health care facility     annual well care exam   • Screening for malignant neoplasm of colon    • Tinnitus    • Vision impairment     Blindness AND/OR vision impairment level        Current Outpatient Medications:   •  amitriptyline (ELAVIL) 25 MG tablet, TAKE 1-2 BY MOUTH TABLET AT BEDTIME, Disp: , Rfl: 5  •  amitriptyline (ELAVIL) 25 MG tablet, Take 25 mg by mouth Every Night. 1-2 tablets, Disp: , Rfl:   •  aspirin 81 MG tablet, Take 81 mg by mouth Daily., Disp: , Rfl:   •  atorvastatin (LIPITOR) 20 MG tablet, Take  by mouth. Take 1 tablet every night at bedtime., Disp: , Rfl:   •  cyclobenzaprine (FLEXERIL) 5 MG tablet, Take 1  tablet by mouth 3 (Three) Times a Day As Needed for Muscle Spasms., Disp: 60 tablet, Rfl: 0  •  lisinopril (PRINIVIL,ZESTRIL) 10 MG tablet, Take 10 mg by mouth Daily., Disp: , Rfl:   •  naproxen sodium (ALEVE) 220 MG tablet, Take 220 mg by mouth 2 (Two) Times a Day As Needed., Disp: , Rfl:   •  Omega 3-6-9 Fatty Acids (TRIPLE OMEGA COMPLEX PO), Take 1 tablet by mouth Daily., Disp: , Rfl:   •  sodium-potassium-magnesium sulfates (SUPREP) 17.5-3.13-1.6 GM/177ML solution oral solution, Take 1 bottle by mouth Every 12 (Twelve) Hours., Disp: 1 bottle, Rfl: 0      Assessment and Plan:    1. Obstructive sleep apnea Established, stable (1)  1. Compliant with PAP therapy  2. Continue PAP as prescribed  3. Script for PAP supplies  4. Return to clinic in 1 year with compliance report unless change in sx  2. Anxiety  3. HTN        Total time 15 min, more than half spent in face to face counseling and coordination of care.    RTC in 12 months. Patient agrees to return sooner if changes in symptoms.        This document has been electronically signed by JOSEFINA Peng on June 6, 2019 11:00 AM                      CC: Angle Sanchez MD          No ref. provider found

## 2019-06-11 ENCOUNTER — ANESTHESIA (OUTPATIENT)
Dept: GASTROENTEROLOGY | Facility: HOSPITAL | Age: 54
End: 2019-06-11

## 2019-06-11 ENCOUNTER — ANESTHESIA EVENT (OUTPATIENT)
Dept: GASTROENTEROLOGY | Facility: HOSPITAL | Age: 54
End: 2019-06-11

## 2019-06-11 ENCOUNTER — HOSPITAL ENCOUNTER (OUTPATIENT)
Facility: HOSPITAL | Age: 54
Setting detail: HOSPITAL OUTPATIENT SURGERY
Discharge: HOME | End: 2019-06-11
Attending: INTERNAL MEDICINE | Admitting: INTERNAL MEDICINE

## 2019-06-11 VITALS
SYSTOLIC BLOOD PRESSURE: 143 MMHG | HEART RATE: 81 BPM | WEIGHT: 257.28 LBS | TEMPERATURE: 97.3 F | HEIGHT: 73 IN | RESPIRATION RATE: 17 BRPM | OXYGEN SATURATION: 94 % | BODY MASS INDEX: 34.1 KG/M2 | DIASTOLIC BLOOD PRESSURE: 97 MMHG

## 2019-06-11 DIAGNOSIS — Z12.11 ENCOUNTER FOR SCREENING FOR MALIGNANT NEOPLASM OF COLON: ICD-10-CM

## 2019-06-11 DIAGNOSIS — R13.10 DYSPHAGIA, UNSPECIFIED TYPE: ICD-10-CM

## 2019-06-11 PROCEDURE — 25010000002 PROPOFOL 10 MG/ML EMULSION: Performed by: NURSE ANESTHETIST, CERTIFIED REGISTERED

## 2019-06-11 PROCEDURE — 43248 EGD GUIDE WIRE INSERTION: CPT | Performed by: INTERNAL MEDICINE

## 2019-06-11 PROCEDURE — 45380 COLONOSCOPY AND BIOPSY: CPT | Performed by: INTERNAL MEDICINE

## 2019-06-11 RX ORDER — DEXTROSE AND SODIUM CHLORIDE 5; .45 G/100ML; G/100ML
30 INJECTION, SOLUTION INTRAVENOUS CONTINUOUS PRN
Status: DISCONTINUED | OUTPATIENT
Start: 2019-06-11 | End: 2019-06-11 | Stop reason: HOSPADM

## 2019-06-11 RX ORDER — PROMETHAZINE HYDROCHLORIDE 25 MG/1
25 TABLET ORAL ONCE AS NEEDED
Status: DISCONTINUED | OUTPATIENT
Start: 2019-06-11 | End: 2019-06-11 | Stop reason: HOSPADM

## 2019-06-11 RX ORDER — LIDOCAINE HYDROCHLORIDE 20 MG/ML
INJECTION, SOLUTION EPIDURAL; INFILTRATION; INTRACAUDAL; PERINEURAL AS NEEDED
Status: DISCONTINUED | OUTPATIENT
Start: 2019-06-11 | End: 2019-06-11 | Stop reason: SURG

## 2019-06-11 RX ORDER — PROPOFOL 10 MG/ML
VIAL (ML) INTRAVENOUS AS NEEDED
Status: DISCONTINUED | OUTPATIENT
Start: 2019-06-11 | End: 2019-06-11 | Stop reason: SURG

## 2019-06-11 RX ORDER — ONDANSETRON 2 MG/ML
4 INJECTION INTRAMUSCULAR; INTRAVENOUS ONCE AS NEEDED
Status: DISCONTINUED | OUTPATIENT
Start: 2019-06-11 | End: 2019-06-11 | Stop reason: HOSPADM

## 2019-06-11 RX ORDER — DEXAMETHASONE SODIUM PHOSPHATE 4 MG/ML
8 INJECTION, SOLUTION INTRA-ARTICULAR; INTRALESIONAL; INTRAMUSCULAR; INTRAVENOUS; SOFT TISSUE ONCE AS NEEDED
Status: DISCONTINUED | OUTPATIENT
Start: 2019-06-11 | End: 2019-06-11 | Stop reason: HOSPADM

## 2019-06-11 RX ORDER — PROMETHAZINE HYDROCHLORIDE 25 MG/1
25 SUPPOSITORY RECTAL ONCE AS NEEDED
Status: DISCONTINUED | OUTPATIENT
Start: 2019-06-11 | End: 2019-06-11 | Stop reason: HOSPADM

## 2019-06-11 RX ORDER — PROMETHAZINE HYDROCHLORIDE 25 MG/ML
12.5 INJECTION, SOLUTION INTRAMUSCULAR; INTRAVENOUS ONCE AS NEEDED
Status: DISCONTINUED | OUTPATIENT
Start: 2019-06-11 | End: 2019-06-11 | Stop reason: HOSPADM

## 2019-06-11 RX ADMIN — LIDOCAINE HYDROCHLORIDE 100 MG: 20 INJECTION, SOLUTION EPIDURAL; INFILTRATION; INTRACAUDAL; PERINEURAL at 15:22

## 2019-06-11 RX ADMIN — PROPOFOL 150 MG: 10 INJECTION, EMULSION INTRAVENOUS at 15:22

## 2019-06-11 RX ADMIN — DEXTROSE AND SODIUM CHLORIDE 30 ML/HR: 5; 450 INJECTION, SOLUTION INTRAVENOUS at 14:05

## 2019-06-11 RX ADMIN — DEXTROSE AND SODIUM CHLORIDE: 5; 450 INJECTION, SOLUTION INTRAVENOUS at 15:16

## 2019-06-11 RX ADMIN — PROPOFOL 50 MG: 10 INJECTION, EMULSION INTRAVENOUS at 15:30

## 2019-06-11 RX ADMIN — PROPOFOL 50 MG: 10 INJECTION, EMULSION INTRAVENOUS at 15:26

## 2019-06-11 RX ADMIN — PROPOFOL 50 MG: 10 INJECTION, EMULSION INTRAVENOUS at 15:33

## 2019-06-11 NOTE — ANESTHESIA POSTPROCEDURE EVALUATION
Patient: Ashwin Robles    Procedure Summary     Date:  06/11/19 Room / Location:  [facility] ENDOSCOPY 2 / [facility] ENDOSCOPY    Anesthesia Start:  1517 Anesthesia Stop:  1537    Procedures:       ESOPHAGOGASTRODUODENOSCOPY (N/A )      COLONOSCOPY (N/A ) Diagnosis:       Dysphagia, unspecified type      Encounter for screening for malignant neoplasm of colon      (Dysphagia, unspecified type [R13.10])      (Encounter for screening for malignant neoplasm of colon [Z12.11])    Surgeon:  Kartik Cole MD Provider:  Luis Cooney CRNA    Anesthesia Type:  MAC ASA Status:  3          Anesthesia Type: MAC  Last vitals  BP   (!) 148/107 (06/11/19 1402)   Temp   98.3 °F (36.8 °C) (06/11/19 1402)   Pulse   83 (06/11/19 1402)   Resp   14 (06/11/19 1402)     SpO2   95 % (06/11/19 1402)     Post Anesthesia Care and Evaluation    Patient location during evaluation: bedside  Patient participation: waiting for patient participation  Level of consciousness: responsive to physical stimuli  Pain score: 0  Pain management: adequate  Airway patency: patent  Anesthetic complications: No anesthetic complications  PONV Status: none  Cardiovascular status: acceptable  Respiratory status: acceptable  Hydration status: acceptable

## 2019-06-11 NOTE — ANESTHESIA PREPROCEDURE EVALUATION
Anesthesia Evaluation     no history of anesthetic complications:  NPO Solid Status: > 8 hours  NPO Liquid Status: > 2 hours           Airway   Mallampati: III  TM distance: >3 FB  Neck ROM: full  No difficulty expected  Dental - normal exam     Pulmonary - normal exam   (+) a smoker Former, sleep apnea on CPAP,   Cardiovascular - normal exam    (+) hypertension less than 2 medications, hyperlipidemia,       Neuro/Psych  (+) psychiatric history Anxiety,     GI/Hepatic/Renal/Endo    (+) obesity,       Musculoskeletal     Abdominal    Substance History      OB/GYN          Other   (+) arthritis                   Anesthesia Plan    ASA 3     MAC     intravenous induction   Anesthetic plan, all risks, benefits, and alternatives have been provided, discussed and informed consent has been obtained with: patient.

## 2019-07-24 ENCOUNTER — CLINICAL SUPPORT (OUTPATIENT)
Dept: AUDIOLOGY | Facility: CLINIC | Age: 54
End: 2019-07-24

## 2019-07-24 DIAGNOSIS — Z46.1 ENCOUNTER FOR FITTING OR ADJUSTMENT OF HEARING AID: Primary | ICD-10-CM

## 2019-07-24 PROCEDURE — V5266 BATTERY FOR HEARING DEVICE: HCPCS | Performed by: AUDIOLOGIST

## 2019-07-24 NOTE — PROGRESS NOTES
HEARING AID CHECK    Name:  Ashwin Robles  :  1965  Age:  54 y.o.  Date of Evaluation:  2019      HISTORY    Reason for visit:  Ashwin Robles is seen today to purchase supplies for his hearing aids.        OFFICE VISIT    During today's visit he picked up 5 packs of batteries at $6.00 each for a total of $30.00.  This amount will be billed to worker's compensation.    It was a pleasure seeing Ashwin Robles in Audiology today.  It is a pleasure helping Mr. Robles with his amplification needs.             This document has been electronically signed by Kaitlynn Alaniz MS CCC-A on 2019 2:45 PM       Kaitlynn Alaniz MS CCC-A  Licensed Audiologist    For Billing and Coding:  Please bill to Worker's Compensation:     Battery for use in hearing device - $30.00

## 2019-07-25 ENCOUNTER — CLINICAL SUPPORT (OUTPATIENT)
Dept: AUDIOLOGY | Facility: CLINIC | Age: 54
End: 2019-07-25

## 2019-07-25 DIAGNOSIS — Z46.1 ENCOUNTER FOR FITTING OR ADJUSTMENT OF HEARING AID: Primary | ICD-10-CM

## 2019-07-25 PROCEDURE — V5266 BATTERY FOR HEARING DEVICE: HCPCS | Performed by: AUDIOLOGIST

## 2019-07-25 NOTE — PROGRESS NOTES
HEARING AID CHECK    Name:  Ashwin Robles  :  1965  Age:  54 y.o.  Date of Evaluation:  2019      HISTORY    Reason for visit:  Ashwin Robles is seen today to purchase hearing aid supplies.      OFFICE VISIT    During today's visit he picked up 5 packs of batteries at $6.00 per pack for a total of $30.00.  This amount will be billed to his Worker's Compensation.    It was a pleasure seeing Ashwin Robles in Audiology today.  It is a pleasure helping Mr. Robles with his amplification needs.             This document has been electronically signed by Kaitlynn Alaniz MS CCC-A on 2019 1:39 PM       Kaitlynn Alaniz MS CCC-A  Licensed Audiologist    For Billing and Coding:  Please bill the following to Worker's Compensation:    Battery for use in hearing device - $30.00

## 2020-01-09 ENCOUNTER — CLINICAL SUPPORT (OUTPATIENT)
Dept: AUDIOLOGY | Facility: CLINIC | Age: 55
End: 2020-01-09

## 2020-01-09 DIAGNOSIS — Z46.1 ENCOUNTER FOR FITTING OR ADJUSTMENT OF HEARING AID: Primary | ICD-10-CM

## 2020-01-09 PROCEDURE — V5266 BATTERY FOR HEARING DEVICE: HCPCS | Performed by: AUDIOLOGIST

## 2020-01-23 NOTE — PROGRESS NOTES
HEARING AID CHECK    Name:  Ashwin Robles  :  1965  Age:  54 y.o.  Date of Evaluation:  2020      HISTORY    Reason for visit:  Ashwin Robles is seen today to purchase hearing aid supplies.     OFFICE VISIT    During today's visit he purchased 10 packs of hearing aid batteries at $6.00 per pack for a total of $60.00. This amount will be billed to worker's compensation.      It was a pleasure seeing Ashwin Robles in Audiology today.  It is a pleasure helping Mr. Robles with his amplification needs.             This document has been electronically signed by Kaitlynn Alaniz MS CCC-A on 2020 1:18 PM       Kaitlynn Alaniz MS CCC-A  Licensed Audiologist    For Billing and Coding:  Please bill the following to worker's compensation:    Battery for use in hearing device - $60.00

## 2020-02-03 ENCOUNTER — HOSPITAL ENCOUNTER (OUTPATIENT)
Dept: GENERAL RADIOLOGY | Facility: HOSPITAL | Age: 55
Discharge: HOME | End: 2020-02-03
Admitting: FAMILY MEDICINE

## 2020-02-03 ENCOUNTER — HOSPITAL ENCOUNTER (OUTPATIENT)
Dept: GENERAL RADIOLOGY | Facility: HOSPITAL | Age: 55
Discharge: HOME | End: 2020-02-03

## 2020-02-03 DIAGNOSIS — Z87.828 HISTORY OF PELVIC TRAUMA: ICD-10-CM

## 2020-02-03 DIAGNOSIS — M54.50 LOW BACK PAIN, UNSPECIFIED BACK PAIN LATERALITY, UNSPECIFIED CHRONICITY, UNSPECIFIED WHETHER SCIATICA PRESENT: ICD-10-CM

## 2020-02-03 PROCEDURE — 72110 X-RAY EXAM L-2 SPINE 4/>VWS: CPT

## 2020-02-03 PROCEDURE — 73502 X-RAY EXAM HIP UNI 2-3 VIEWS: CPT

## 2020-04-21 ENCOUNTER — CLINICAL SUPPORT (OUTPATIENT)
Dept: AUDIOLOGY | Facility: CLINIC | Age: 55
End: 2020-04-21

## 2020-04-21 DIAGNOSIS — Z46.1 ENCOUNTER FOR FITTING OR ADJUSTMENT OF HEARING AID: Primary | ICD-10-CM

## 2020-04-21 PROCEDURE — HEARINGNOCHG: Performed by: AUDIOLOGIST

## 2020-04-21 NOTE — PROGRESS NOTES
HEARING AID CHECK    Name:  Ashwin Robles  :  1965  Age:  55 y.o.  Date of Evaluation:  2020      HISTORY    Reason for visit:  Ashwin Robles is seen today for a hearing aid check.  Patient reports his hearing aid quit working.    Hearing aid history:  Patient is currently wearing a In the Canal (ITC) hearing aid in both ear(s).     OFFICE VISIT    During today's visit visual inspection revealed microphone was stopped up.  Microphone was cleaned and the hearing aid is in good working condition.  Patient will return for hearing aid assistance as necessary.      It was a pleasure seeing Ashwin Robles in Audiology today.  It is a pleasure helping Mr. Robles with his amplification needs.             This document has been electronically signed by Kaitlynn Alaniz MS CCC-A on 2020 10:36       Kaitlynn Alaniz MS CCC-SOLOMON  Licensed Audiologist    For Billing and Codin  Hearing Aid Check, Mon[employer] - no charge

## 2020-06-05 ENCOUNTER — CLINICAL SUPPORT (OUTPATIENT)
Dept: AUDIOLOGY | Facility: CLINIC | Age: 55
End: 2020-06-05

## 2020-06-05 DIAGNOSIS — Z46.1 ENCOUNTER FOR FITTING OR ADJUSTMENT OF HEARING AID: Primary | ICD-10-CM

## 2020-06-05 PROCEDURE — V5267 HEARING AID SUP/ACCESS/DEV: HCPCS | Performed by: AUDIOLOGIST

## 2020-06-05 PROCEDURE — V5266 BATTERY FOR HEARING DEVICE: HCPCS | Performed by: AUDIOLOGIST

## 2020-06-08 NOTE — PROGRESS NOTES
HEARING AID CHECK    Name:  Ashwin Robles  :  1965  Age:  55 y.o.  Date of Evaluation:  2020      HISTORY    Reason for visit:  Ashwin Robles is seen today to  supplies for his hearing aids.    Hearing aid history:  Patient is currently wearing a In the Canal (ITC) hearing aid in both ear(s).     OFFICE VISIT    During today's visit patient picked up 10 packs of hearing aid batteries at $6.00 per pack, and 15 packs of wax traps at $9.00 per pack.  The total will be billed to his worker's comp.      It was a pleasure seeing Ashwin Robles in Audiology today.  It is a pleasure helping Mr. Robles with his amplification needs.             This document has been electronically signed by Kaitlynn Alaniz MS CCC-A on 2020 14:18       Kaitlynn Alaniz MS CCC-A  Licensed Audiologist    For Billing and Coding:  Please bill the following to worker's compensation:    Battery for use in hearing device - $60.00     Hearing Aid Supplies / Accessories - $135.00

## 2020-06-28 ENCOUNTER — APPOINTMENT (OUTPATIENT)
Dept: CT IMAGING | Facility: HOSPITAL | Age: 55
End: 2020-06-28

## 2020-06-28 ENCOUNTER — APPOINTMENT (OUTPATIENT)
Dept: GENERAL RADIOLOGY | Facility: HOSPITAL | Age: 55
End: 2020-06-28

## 2020-06-28 ENCOUNTER — HOSPITAL ENCOUNTER (EMERGENCY)
Facility: HOSPITAL | Age: 55
Discharge: HOME | End: 2020-06-28
Attending: FAMILY MEDICINE | Admitting: FAMILY MEDICINE

## 2020-06-28 VITALS
OXYGEN SATURATION: 95 % | HEIGHT: 73 IN | TEMPERATURE: 97.2 F | SYSTOLIC BLOOD PRESSURE: 140 MMHG | RESPIRATION RATE: 20 BRPM | WEIGHT: 253 LBS | DIASTOLIC BLOOD PRESSURE: 86 MMHG | BODY MASS INDEX: 33.53 KG/M2 | HEART RATE: 71 BPM

## 2020-06-28 DIAGNOSIS — S66.912A WRIST STRAIN, LEFT, INITIAL ENCOUNTER: ICD-10-CM

## 2020-06-28 DIAGNOSIS — S96.912A ANKLE STRAIN, LEFT, INITIAL ENCOUNTER: ICD-10-CM
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
## 2020-06-28 PROCEDURE — 99283 EMERGENCY DEPT VISIT LOW MDM: CPT

## 2020-06-28 PROCEDURE — 73110 X-RAY EXAM OF WRIST: CPT

## 2020-06-28 PROCEDURE — 73610 X-RAY EXAM OF ANKLE: CPT

## 2020-06-28 PROCEDURE — 70450 CT HEAD/BRAIN W/O DYE: CPT

## 2020-06-28 RX ORDER — LIDOCAINE HYDROCHLORIDE 10 MG/ML
10 INJECTION, SOLUTION EPIDURAL; INFILTRATION; INTRACAUDAL; PERINEURAL ONCE
Status: DISCONTINUED | OUTPATIENT
Start: 2020-06-28 | End: 2020-06-28

## 2020-06-28 RX ORDER — ACETAMINOPHEN AND CODEINE PHOSPHATE 300; 30 MG/1; MG/1
1-2 TABLET ORAL EVERY 6 HOURS PRN
Qty: 16 TABLET | Refills: 0 | Status: SHIPPED | OUTPATIENT
Start: 2020-06-28 | End: 2020-06-28

## 2021-01-12 DIAGNOSIS — G47.33 OBSTRUCTIVE SLEEP APNEA, ADULT: Primary | ICD-10-CM

## 2021-01-19 ENCOUNTER — OFFICE VISIT (OUTPATIENT)
Dept: SLEEP MEDICINE | Facility: HOSPITAL | Age: 56
End: 2021-01-19

## 2021-01-19 VITALS
HEART RATE: 75 BPM | HEIGHT: 73 IN | OXYGEN SATURATION: 97 % | BODY MASS INDEX: 35.78 KG/M2 | WEIGHT: 270 LBS | DIASTOLIC BLOOD PRESSURE: 93 MMHG | SYSTOLIC BLOOD PRESSURE: 139 MMHG

## 2021-01-19 DIAGNOSIS — G47.33 OBSTRUCTIVE SLEEP APNEA, ADULT: Primary | ICD-10-CM

## 2021-01-19 PROCEDURE — 99213 OFFICE O/P EST LOW 20 MIN: CPT | Performed by: NURSE PRACTITIONER

## 2021-01-19 NOTE — PROGRESS NOTES
Sleep Clinic Follow Up    Date: 2021  Primary Care Provider: Angle Sanchez MD    Last office visit: 2019 (I reviewed this note)    CC: Follow up: RADHA on CPAP      Interim History:  Since the last visit:    1) mild RADHA -  Ashwin Robles has remained compliant with CPAP. He denies mask and machine issues, dry mouth, headaches, or pressures intolerance. He denies abnormal dreams, sleep paralysis, nasal congestion, URI sx.      2) Patient denies RLS symptoms.     Sleep Testin. PSG on 2012, AHI of 11.5   2. Currently on 10-20 cm H2O    PAP Data:    Time frame: 10/01/2020-2021   Compliance: 99.1 %  Average use on days used: 7hrs 47 min  Percent of days with usage greater than or equal to 4 hours: 99.1%  PAP range: 10-20 cm H2O  Average 90% pressure: 12.5 cmH2O  Leak: 0 minutes  Average AHI: 1.9 events/hr  Mask type: Nasal pillows  DME: [facility]    Bed time: varies, most of the time around 2300  Sleep latency: 15 minutes  Number of times awakens during the night: 3  Wake time: 6873-0522  Estimated total sleep time at night: 7-8 hours  Caffeine intake: 0 cups of coffee, 0-2 cups of tea, and 0 sodas per day  Alcohol intake: 0 drinks per week  Nap time: some days, up to 1 hour    Sleepiness with Driving: does not drive     [place] - 3        PMHx, FH, SH reviewed and pertinent changes are:  unchanged from last office visit on 2019      REVIEW OF SYSTEMS:   Negative for chest pain, SOA, fever, chills, cough, N/V/D, abdominal pain.    Smoking:none        Exam:  Vitals:    21 0840   BP: 139/93   Pulse: 75   SpO2: 97%           21  0840   Weight: 122 kg (270 lb)     Body mass index is 35.63 kg/m². Patient's Body mass index is 35.63 kg/m². BMI is above normal parameters. Recommendations include: referral to primary care.      Gen:                No distress, conversant, pleasant, appears stated age, alert, oriented  Eyes:               Anicteric sclera, moist conjunctiva, no  lid lag                           PERRL, EOMI   Heent:             NC/AT                          Oropharynx clear                          Normal hearing  Lungs:             normal effort, non-labored breathing                          Clear to auscultation bilaterally          CV:                  Normal S1/S2, no murmur                          no lower extremity edema              Psych:             Appropriate affect  Neuro:             CN 2-12 appear intact    Past Medical History:   Diagnosis Date   • Anxiety    • Dysphagia    • Encounter for routine adult health examination    • Essential hypertension    • Hearing loss    • History of colonoscopy 05/28/2014    Internal & external hemorrhoids found.   • History of esophagogastroduodenoscopy 05/28/2014    EGD w/ tube 90613 (1) -  Esophagitis seen. Biopsy taken. Gastritis in stomach. Biopsy taken. Normal duodenum.   • History of nutritional disorder    • Hyperlipidemia    • Hypertensive disorder     will f/u    • Insomnia    • Nevus, non-neoplastic     Left side of his chin    • Obstructive sleep apnea of adult    • Osteoarthritis of multiple joints    • Pain in lower limb    • Routine general medical examination at a health care facility     annual well care exam   • Screening for malignant neoplasm of colon    • Tinnitus    • Vision impairment     Blindness AND/OR vision impairment level        Current Outpatient Medications:   •  aspirin 81 MG tablet, Take 81 mg by mouth Daily., Disp: , Rfl:   •  atorvastatin (LIPITOR) 20 MG tablet, Take  by mouth. Take 1 tablet every night at bedtime., Disp: , Rfl:   •  cyclobenzaprine (FLEXERIL) 5 MG tablet, Take 1 tablet by mouth 3 (Three) Times a Day As Needed for Muscle Spasms., Disp: 60 tablet, Rfl: 0  •  lisinopril (PRINIVIL,ZESTRIL) 10 MG tablet, Take 10 mg by mouth Daily., Disp: , Rfl:   •  Omega 3-6-9 Fatty Acids (TRIPLE OMEGA COMPLEX PO), Take 1 tablet by mouth Daily., Disp: , Rfl:       Assessment and  Plan:    1. Obstructive sleep apnea  -Established, stable (1)  1. Compliant with PAP therapy  2. Continue PAP as prescribed  3. Script for PAP supplies  4. Return to clinic in 12 months with compliance report unless change in symptoms in interim period          I spent 15 minutes caring for Ashwin on this date of service. This time includes time spent by me in the following activities: preparing for the visit, obtaining and/or reviewing a separately obtained history, performing a medically appropriate examination and/or evaluation, counseling and educating the patient/family/caregiver, ordering medications, tests, or procedures and documenting information in the medical record; PAP maintenance, compliance, management.           This document has been electronically signed by JOSEFINA Teixeira on January 19, 2021 08:43 CST            CC: Angle Sanchez MD          No ref. provider found

## 2021-02-04 ENCOUNTER — CLINICAL SUPPORT (OUTPATIENT)
Dept: AUDIOLOGY | Facility: CLINIC | Age: 56
End: 2021-02-04

## 2021-02-04 DIAGNOSIS — Z46.1 ENCOUNTER FOR FITTING OR ADJUSTMENT OF HEARING AID: Primary | ICD-10-CM

## 2021-02-04 PROCEDURE — HEARINGNOCHG: Performed by: AUDIOLOGIST

## 2021-02-04 NOTE — PROGRESS NOTES
HEARING AID CHECK    Name:  Ashwin Robles  :  1965  Age:  56 y.o.  Date of Evaluation:  2021      HISTORY    Reason for visit:  Ashwin Robles is seen today to  his hearing aid which was repaired in-house.  He states his left hearing aid would suddenly stop working.  He states he changes his wax traps a couple times a week.  Patient reports he wants to know more strategies to maintain his hearing aids at home.    Hearing aid history:  Patient is currently wearing a In the Canal (ITC) hearing aid in both ear(s).     OFFICE VISIT    During today's visit he was shown how to clean the microphones at home. If the hearing aid continues to quit working even after cleaning, he was instructed to bring the hearing aid back to me so I can send it off for repair.  He will return for hearing aid assistance as necessary.     It was a pleasure seeing Ashwin Robles in Audiology today.  It is a pleasure helping Mr. Robles with his amplification needs.             This document has been electronically signed by Kaitlynn Alaniz MS CCC-A on 2021 16:27 [facility]       Kaitlynn Alaniz MS CCC-A  Licensed Audiologist    For Billing and Codin  Hearing Aid Check, [employer] - no charge

## 2021-03-29 ENCOUNTER — IMMUNIZATION (OUTPATIENT)
Dept: VACCINE CLINIC | Facility: HOSPITAL | Age: 56
End: 2021-03-29

## 2021-03-29 PROCEDURE — 91300 HC SARSCOV02 VAC 30MCG/0.3ML IM: CPT | Performed by: THORACIC SURGERY (CARDIOTHORACIC VASCULAR SURGERY)

## 2021-03-29 PROCEDURE — 0001A: CPT | Performed by: THORACIC SURGERY (CARDIOTHORACIC VASCULAR SURGERY)

## 2021-04-07 ENCOUNTER — APPOINTMENT (OUTPATIENT)
Dept: VACCINE CLINIC | Facility: HOSPITAL | Age: 56
End: 2021-04-07

## 2021-04-19 ENCOUNTER — IMMUNIZATION (OUTPATIENT)
Dept: VACCINE CLINIC | Facility: HOSPITAL | Age: 56
End: 2021-04-19

## 2021-04-19 PROCEDURE — 0002A: CPT | Performed by: THORACIC SURGERY (CARDIOTHORACIC VASCULAR SURGERY)

## 2021-04-19 PROCEDURE — 91300 HC SARSCOV02 VAC 30MCG/0.3ML IM: CPT | Performed by: THORACIC SURGERY (CARDIOTHORACIC VASCULAR SURGERY)

## 2021-06-21 ENCOUNTER — CLINICAL SUPPORT (OUTPATIENT)
Dept: AUDIOLOGY | Facility: CLINIC | Age: 56
End: 2021-06-21

## 2021-06-21 DIAGNOSIS — Z46.1 ENCOUNTER FOR FITTING OR ADJUSTMENT OF HEARING AID: Primary | ICD-10-CM

## 2021-06-21 PROCEDURE — V5267 HEARING AID SUP/ACCESS/DEV: HCPCS | Performed by: AUDIOLOGIST

## 2021-06-21 PROCEDURE — V5266 BATTERY FOR HEARING DEVICE: HCPCS | Performed by: AUDIOLOGIST

## 2021-06-22 NOTE — PROGRESS NOTES
HEARING AID CHECK    Name:  Ashwin Robles  :  1965  Age:  56 y.o.  Date of Evaluation:  2021      HISTORY    Reason for visit:  Ashwin Robles is seen today to  hearing aid supplies.    Hearing aid history:  Patient is currently wearing a In the Canal (ITC) hearing aid in both ear(s).     OFFICE VISIT    During today's visit patient purchased 10 packs of batteries at $6.00 per pack, and 5 packs of wax traps at $9.00 per pack for a total of $105.00.  This amount will be billed to his worker's compensation.      It was a pleasure seeing Ashwin Robles in Audiology today.  It is a pleasure helping Mr. Robles with his amplification needs.             This document has been electronically signed by Kaitlynn Alaniz MS CCC-A on 2021 16:50 CDT       Kaitlynn Alaniz MS CCC-A  Licensed Audiologist    For Billing and Coding:  Please bill the following to worker's compensation:    Battery for use in hearing device - $60.00     Hearing Aid Supplies / Accessories - $45.00

## 2021-11-23 ENCOUNTER — CLINICAL SUPPORT (OUTPATIENT)
Dept: AUDIOLOGY | Facility: CLINIC | Age: 56
End: 2021-11-23

## 2021-11-23 DIAGNOSIS — H69.83 EUSTACHIAN TUBE DYSFUNCTION, BILATERAL: ICD-10-CM

## 2021-11-23 DIAGNOSIS — H90.A32 MIXED CONDUCTIVE AND SENSORINEURAL HEARING LOSS OF LEFT EAR WITH RESTRICTED HEARING OF RIGHT EAR: Primary | ICD-10-CM

## 2021-11-23 PROCEDURE — 92567 TYMPANOMETRY: CPT | Performed by: AUDIOLOGIST

## 2021-11-23 PROCEDURE — 92557 COMPREHENSIVE HEARING TEST: CPT | Performed by: AUDIOLOGIST

## 2021-11-24 NOTE — PROGRESS NOTES
HEARING AID EVALUATION WITH AUDIOGRAM    Name:  Ashwin Robles  :  1965  Age:  56 y.o.  Date of Evaluation:  2021      HISTORY    Reason for visit:  Ashwin Robles is seen today for a hearing test and hearing aid evaluation at the request of Dr. Angle Sanchez.  Patient reports he sustained a hearing loss at work when he fell off a building.  He states he had surgery in his left ear several years to have a prosthesis placed in his ear, but he states this did not help his hearing.  He states he has been wearing a hearing aid in his left ear since the injury.  He states he is needing a new hearing aid at this time.      Hearing aid history:  Current aid(s) 5 1/2 years old. and Patient is interested in hearing aids at this time.    EVALUATION    See Audiogram    RESULTS:    Otoscopy and Tympanometry 226 Hz :  Right Ear:  Otoscopy:  Clear ear canal          Tympanometry:  Negative middle ear pressure    Left Ear:   Otoscopy:  Clear ear canal        Tympanometry:  Reduced pressure and compliance     Test technique:  Standard Audiometry     Pure Tone Audiometry:   Patient responded to pure tones at 30-90 dB for 250-8000 Hz in right ear, and at  dB for 250-6000 Hz in left ear.       Speech Audiometry:        Right Ear:  Speech Reception Threshold (SRT) was obtained at 25 dBHL                 Speech Discrimination scores were 100% in quiet when words were presented at 65 dBHL       Left Ear:  Speech Reception Threshold (SRT) was obtained at 50 dBHL                 Speech Discrimination scores were 76% in masking noise when words were presented at  90 dBHL    Reliability:   good    IMPRESSIONS:  1.  Tympanometry results are consistent with Negative middle ear pressure in right ear, and Reduced pressure and compliance  in left ear.  2.  Pure tone results are consistent with mild to profound sloping sensorineural hearing loss  for right ear, and moderate to profound sloping mixed hearing loss   in left ear.       RECOMMENDATIONS:  Test results were reviewed with patient, and all questions were answered at this time. Amplification options were discussed.  The average life of a hearing aid is 5 years, and his hearing is over 5 years old.  Also, he has experienced a significant decrease in hearing thresholds in his left ear since he first got the hearing aid 5 1/2 years ago.  For these 2 reasons, a new hearing aid is recommended at this time.        During the Hearing Aid discussion, Mr. Robles has chosen 1 In the Canal (ITC) hearing aid(s) for left ear.  The hearing aid recommended is from [employer] with the [employer] M 90-10 NW O digital circuit.  An impression of his left ear was made at this time.      An authorization request will be sent to Worker's Compensation to provide this hearing aid for the patient.  Once authorization has been received, the hearing aid will be ordered and the patient will be contacted to make an appointment for his fitting.          It was a pleasure seeing Ashwin Robles in Audiology today.  I look forward to helping Mr. Robles with his amplification needs.            This document has been electronically signed by Kaitlynn Alaniz MS CCC-SOLOMON on November 24, 2021 16:16 CST       Kaitlynn Alaniz MS CCC-SOLOMON  Licensed Audiologist

## 2022-01-19 ENCOUNTER — CLINICAL SUPPORT (OUTPATIENT)
Dept: AUDIOLOGY | Facility: CLINIC | Age: 57
End: 2022-01-19

## 2022-01-19 DIAGNOSIS — Z46.1 ENCOUNTER FOR FITTING OR ADJUSTMENT OF HEARING AID: Primary | ICD-10-CM

## 2022-01-19 PROCEDURE — V5090 HEARING AID DISPENSING FEE: HCPCS | Performed by: AUDIOLOGIST

## 2022-01-19 NOTE — PROGRESS NOTES
HEARING AID FITTING    Name:  Ashwin Robles  :  1965  Age:  56 y.o.  Date of Evaluation:  2022      HISTORY    Reason for visit:  Ashwin Robles is seen today for a hearing aid fitting.  The hearing aids to be fit are Completely in the Canal (CIC) hearing aids from [employer] with the [employer] M90-10 NW O digital circuit.    Right Hearing Aid Serial Number: none  Left Hearing Aid Serial Number: [id number]      Warranty Expiration:  's warranty extends through 2025 which covers repairs, loss and damage.    Battery Size:  10    The hearing aids were fit to Mr. Robles's ears.  Patient reported the fit was comfortable and the sound was good.  Patient was instructed on use and care of the hearing instruments.  The necessary paperwork was signed.  All questions were answered at this time.    An agreement has been previously signed with his worker's compensation company, [email or web address], that we will bill $750.00 for the dispensing fee.  The patient's signed delivery confirmation and the packing slip with all hearing aid information has been forwarded to [email or web address] as requested.  Worker's Compensation will be billed for the cost of the hearing aid(s).    Mr. Robles will return in 2 weeks for a hearing aid follow up.  At that time we will make adjustments to the hearing aid(s) and address problems as necessary.      It was a pleasure seeing Ashwin Robles in Audiology today.  It is a pleasure helping Mr. Robles with his amplification needs.             This document has been electronically signed by Kaitlynn Alaniz MS CCC-A on 2022 09:36 CST        Kaitlynn Alaniz MS CCC-SOLOMON  Licensed Audiologist      For Billing and Coding:  Please bill worker's compensation for the following charges:    Dispensing Fee - $750.00

## 2022-02-10 ENCOUNTER — CLINICAL SUPPORT (OUTPATIENT)
Dept: AUDIOLOGY | Facility: CLINIC | Age: 57
End: 2022-02-10

## 2022-02-10 DIAGNOSIS — Z46.1 ENCOUNTER FOR FITTING OR ADJUSTMENT OF HEARING AID: Primary | ICD-10-CM

## 2022-02-10 PROCEDURE — HEARINGNOCHG: Performed by: AUDIOLOGIST

## 2022-02-14 NOTE — PROGRESS NOTES
HEARING AID CHECK    Name:  Ashwin Robles  :  1965  Age:  57 y.o.  Date of Evaluation:  2022      HISTORY    Reason for visit:  Ashwin Robles is seen today for a hearing aid follow up.  Patient reports he hears okay with his new hearing aid, but he hears a lisping sound and sometimes water running sounds like it's echoing.    Hearing aid history:  Patient is currently wearing a Completely in the Canal (CIC) hearing aid in left ear ear(s).     OFFICE VISIT    During today's visit computer was used to improve the lisping sound and reduce the occlusion effect causing the echo sound.  He reported the sound is better.  He will return for hearing aid assistance as necessary.    It was a pleasure seeing Ashwin Robles in Audiology today.  It is a pleasure helping Mr. Robles with his amplification needs.             This document has been electronically signed by Kaitlynn Alaniz MS CCC-A on 2022 08:19 CST       Kaitlynn Alaniz MS CCC-A  Licensed Audiologist    For Billing and Codin  Hearing Aid Check, [employer] - no charge

## 2022-02-17 ENCOUNTER — OFFICE VISIT (OUTPATIENT)
Dept: SLEEP MEDICINE | Facility: HOSPITAL | Age: 57
End: 2022-02-17

## 2022-02-17 VITALS
DIASTOLIC BLOOD PRESSURE: 88 MMHG | HEART RATE: 68 BPM | SYSTOLIC BLOOD PRESSURE: 141 MMHG | OXYGEN SATURATION: 95 % | WEIGHT: 268.3 LBS | HEIGHT: 73 IN | BODY MASS INDEX: 35.56 KG/M2

## 2022-02-17 DIAGNOSIS — G47.33 OSA (OBSTRUCTIVE SLEEP APNEA): Primary | ICD-10-CM

## 2022-02-17 PROCEDURE — 99212 OFFICE O/P EST SF 10 MIN: CPT | Performed by: NURSE PRACTITIONER

## 2022-02-17 NOTE — PROGRESS NOTES
Sleep Clinic Follow Up    Date: 2022  Primary Care Provider: Angle Sanchez MD    Last office visit: 2021 (I reviewed this note)    CC: Follow up: RADHA on CPAP, annual follow-up       Interim History:  Since the last visit:    1) mild RADHA -  Ashwin Robles has remained compliant with CPAP. He denies mask and machine issues, dry mouth, headaches, or pressures intolerance. He denies abnormal dreams, sleep paralysis, nasal congestion, URI sx.  His old machine was part of [employer] safety recall. He received new machine in the mail last week. Overall he is doing well on CPAP.     2) Patient denies RLS symptoms.     Sleep Testin. PSG on 2012, AHI of 11.5   2. Currently on 10-20 cm H2O    PAP Data:    Time frame: 2021-2022   Compliance: 99.4 %  Average use on days used: 8hrs 17 min  Percent of days with usage greater than or equal to 4 hours: 99.2%  PAP range: 10-20 cm H2O  Average 90% pressure: 13 cmH2O  Leak: 0 minutes  Average AHI: 2.4 events/hr  Mask type: Nasal pillows  DME: [facility]    Bed time: 2200 or 0200  Sleep latency: 5 minutes  Number of times awakens during the night: 0  Wake time: 0600 or 0900  Estimated total sleep time at night: 6-7 hours  Caffeine intake: 0 cups of coffee, 1 cups of tea, and 0 sodas per day  Alcohol intake: 0 drinks per week  Nap time: some days    Sleepiness with Driving: denies , does not drive     [place] - 7        PMHx, FH, SH reviewed and pertinent changes are:  unchanged from last office visit on 2021      REVIEW OF SYSTEMS:   Negative for chest pain, SOA, fever, chills, cough, N/V/D, abdominal pain.    Smoking:none, former           Exam:  Vitals:    22 08   BP: 141/88   Pulse: 68   SpO2: 95%           22   Weight: 122 kg (268 lb 4.8 oz)     Body mass index is 35.41 kg/m². Patient's Body mass index is 35.41 kg/m². indicating that he is obese (BMI >30). Obesity-related health conditions include the following:  obstructive sleep apnea. Obesity is unchanged. BMI is is above average; BMI management plan is completed. We discussed portion control and increasing exercise..      Gen:                No distress, conversant, pleasant, appears stated age, alert, oriented  Eyes:               Anicteric sclera, moist conjunctiva, no lid lag                           EOMI   Lungs:             normal effort, non-labored breathing                          Clear to auscultation bilaterally          CV:                  Normal S1/S2, no murmur                          no lower extremity edema                 Psych:             Appropriate affect  Neuro:             CN 2-12 appear intact    Past Medical History:   Diagnosis Date   • Anxiety    • Dysphagia    • Encounter for routine adult health examination    • Essential hypertension    • Hearing loss    • History of colonoscopy 05/28/2014    Internal & external hemorrhoids found.   • History of esophagogastroduodenoscopy 05/28/2014    EGD w/ tube 01522 (1) -  Esophagitis seen. Biopsy taken. Gastritis in stomach. Biopsy taken. Normal duodenum.   • History of nutritional disorder    • Hyperlipidemia    • Hypertensive disorder     will f/u    • Insomnia    • Nevus, non-neoplastic     Left side of his chin    • Obstructive sleep apnea of adult    • Osteoarthritis of multiple joints    • Pain in lower limb    • Routine general medical examination at a health care facility     annual well care exam   • Screening for malignant neoplasm of colon    • Tinnitus    • Vision impairment     Blindness AND/OR vision impairment level        Current Outpatient Medications:   •  aspirin 81 MG tablet, Take 81 mg by mouth Daily., Disp: , Rfl:   •  atorvastatin (LIPITOR) 20 MG tablet, Take  by mouth. Take 1 tablet every night at bedtime., Disp: , Rfl:   •  lisinopril (PRINIVIL,ZESTRIL) 10 MG tablet, Take 10 mg by mouth Daily., Disp: , Rfl:   •  Omega 3-6-9 Fatty Acids (TRIPLE OMEGA COMPLEX PO), Take 1 tablet  by mouth Daily., Disp: , Rfl:       Assessment and Plan:    1. Obstructive sleep apnea  -Established, stable (1)  1. Compliant with PAP therapy  2. Continue PAP as prescribed  3. Script for PAP supplies  4. Drowsy driving tips- do not drive if feeling sleepy   5. Return to clinic in 12 months with compliance report unless change in symptoms in interim period          I spent 15 minutes caring for Ashwin on this date of service. This time includes time spent by me in the following activities: preparing for the visit, obtaining and/or reviewing a separately obtained history, performing a medically appropriate examination and/or evaluation, counseling and educating the patient/family/caregiver, ordering medications, tests, or procedures and documenting information in the medical record.           This document has been electronically signed by JOSEFINA Teixeira on February 17, 2022 08:39 CST            CC: Angle Sanchez MD          No ref. provider found

## 2022-12-06 ENCOUNTER — HOSPITAL ENCOUNTER (OUTPATIENT)
Dept: ULTRASOUND IMAGING | Facility: HOSPITAL | Age: 57
Discharge: HOME | End: 2022-12-06
Admitting: FAMILY MEDICINE

## 2022-12-06 DIAGNOSIS — R22.9 LOCALIZED SOFT TISSUE SWELLING: ICD-10-CM

## 2022-12-06 PROCEDURE — 76882 US LMTD JT/FCL EVL NVASC XTR: CPT

## 2023-01-17 RX ORDER — ATORVASTATIN CALCIUM 10 MG/1
10 TABLET, FILM COATED ORAL DAILY
COMMUNITY
Start: 2022-11-23

## 2023-01-17 RX ORDER — LISINOPRIL 20 MG/1
20 TABLET ORAL DAILY
COMMUNITY
Start: 2022-11-28

## 2023-01-18 ENCOUNTER — OFFICE VISIT (OUTPATIENT)
Dept: SURGERY | Facility: CLINIC | Age: 58
End: 2023-01-18
Payer: OTHER MISCELLANEOUS

## 2023-01-18 VITALS
WEIGHT: 247 LBS | DIASTOLIC BLOOD PRESSURE: 82 MMHG | HEIGHT: 73 IN | BODY MASS INDEX: 32.74 KG/M2 | SYSTOLIC BLOOD PRESSURE: 126 MMHG | HEART RATE: 56 BPM | TEMPERATURE: 96.8 F

## 2023-01-18 DIAGNOSIS — M25.559 HIP PAIN: Primary | ICD-10-CM

## 2023-01-18 PROCEDURE — 99203 OFFICE O/P NEW LOW 30 MIN: CPT | Performed by: SURGERY

## 2023-01-18 RX ORDER — MAGNESIUM CHLORIDE 64 MG
TABLET, DELAYED RELEASE (ENTERIC COATED) ORAL DAILY
COMMUNITY

## 2023-01-18 RX ORDER — TADALAFIL 20 MG/1
TABLET ORAL
COMMUNITY
Start: 2023-01-09

## 2023-01-18 NOTE — PROGRESS NOTES
Subjective   Ashwin Robles is a 57 y.o. male     Chief Complaint: Lipoma right hip    History of Present Illness referred by primary care provider after patient presented to her after noticing what he thought was a mass posterior right hip.  Ultrasound was obtained and by report suggested a large lipoma.  Suggest a CT scan.  Patient first noticed this about 6 weeks ago.  His history is significant for howard pain in that hip.  He has been disabled after a fall 1930 5 years ago from 37 feet.  At the time he crushed his pelvis as well as fractured his right hip as well as had a closed head injury.  He had to have neurosurgery done at [place] and he subsequently recovered but was unable to work after that.  He has pain in both of his hips more so on the right side and thought this may be related.    Review of Systems   Constitutional: Negative.    HENT: Positive for hearing loss.    Eyes: Negative.    Respiratory: Negative.    Cardiovascular: Negative.    Gastrointestinal: Negative.    Endocrine: Negative.    Musculoskeletal: Positive for arthralgias.   Skin: Negative.    Allergic/Immunologic: Negative.    Neurological: Positive for dizziness.   Hematological: Negative.    Psychiatric/Behavioral: Negative.      Past Medical History:   Diagnosis Date   • Anxiety    • Dysphagia    • Encounter for routine adult health examination    • Essential hypertension    • Hearing loss    • History of colonoscopy 05/28/2014    Internal & external hemorrhoids found.   • History of esophagogastroduodenoscopy 05/28/2014    EGD w/ tube 67471 (1) -  Esophagitis seen. Biopsy taken. Gastritis in stomach. Biopsy taken. Normal duodenum.   • History of nutritional disorder    • Hyperlipidemia    • Hypertensive disorder     will f/u    • Insomnia    • Nevus, non-neoplastic     Left side of his chin    • Obstructive sleep apnea of adult    • Osteoarthritis of multiple joints    • Pain in lower limb    • Routine general medical  examination at a health care facility     annual well care exam   • Screening for malignant neoplasm of colon    • Tinnitus    • Vision impairment     Blindness AND/OR vision impairment level      Past Surgical History:   Procedure Laterality Date   • BRAIN SURGERY     • COLONOSCOPY N/A 6/11/2019    Procedure: COLONOSCOPY;  Surgeon: Kartik Cole MD;  Location: [facility] ENDOSCOPY;  Service: Gastroenterology   • ELBOW PROCEDURE     • ENDOSCOPY N/A 6/11/2019    Procedure: ESOPHAGOGASTRODUODENOSCOPY;  Surgeon: Kartik Cole MD;  Location: [facility] ENDOSCOPY;  Service: Gastroenterology   • EXTERNAL EAR SURGERY       Family History   Problem Relation Age of Onset   • Cancer Other         Liver Cancer   • Diabetes Other    • Hyperlipidemia Other    • Hypertension Other    • Osteoarthritis Other    • Stroke Other    • Hearing loss Other    • Heart disease Other      Social History     Socioeconomic History   • Marital status:      Spouse name: Keisha Robles   Tobacco Use   • Smoking status: Former   • Smokeless tobacco: Former   Vaping Use   • Vaping Use: Never used   Substance and Sexual Activity   • Alcohol use: No   • Drug use: No   • Sexual activity: Defer     Allergies   Allergen Reactions   • Codeine Nausea And Vomiting     Vitals:    01/18/23 0901   BP: 126/82   Pulse: 56   Temp: 96.8 °F (36 °C)       Home Medications:  Prior to Admission medications    Medication Sig Start Date End Date Taking? Authorizing Provider   aspirin 81 MG tablet Take 81 mg by mouth Daily.   Yes Carolyn Domingo MD   atorvastatin (LIPITOR) 10 MG tablet Take 10 mg by mouth Daily. 11/23/22  Yes Carolyn Domingo MD   lisinopril (PRINIVIL,ZESTRIL) 20 MG tablet Take 20 mg by mouth Daily. 11/28/22  Yes Carolyn Domingo MD   magnesium chloride ER 64 MG DR tablet Take  by mouth Daily.   Yes Carolyn Domingo MD   Omega 3-6-9 Fatty Acids (TRIPLE OMEGA COMPLEX PO) Take 1 tablet by mouth Daily.   Yes Carolyn Domingo MD    tadalafil (CIALIS) 20 MG tablet TAKE 1 TABLET BY MOUTH EVERY 72 HOURS AS NEEDED FOR ERECTILE DYSFUNCTION 1/9/23  Yes Provider, MD Carolyn       Objective   Physical Exam  Constitutional:       General: He is not in acute distress.     Appearance: Normal appearance. He is not ill-appearing or toxic-appearing.   HENT:      Nose: Nose normal.   Eyes:      General: No scleral icterus.  Pulmonary:      Effort: No respiratory distress.      Breath sounds: No stridor.   Abdominal:      General: There is no distension.      Palpations: Abdomen is soft. There is no mass.      Tenderness: There is no abdominal tenderness.   Neurological:      Mental Status: He is alert.     Examined him standing.  The area of concern is overlying his upper posterior hip and appears to be just prominent subcutaneous tissue compared to the other side and may be because of his pelvis is somewhat altered.  No distinct mass appreciated on physical exam.  No skin abnormalities.    Assessment & Plan Suspect this is his prominent subcutaneous tissue and his pelvis is causing this to be prominent likely from his previous trauma that he suffered.  Subcutaneous lipoma would not be causing pain specifically that he describes and that is more likely related to his previous trauma as well.  Since there is some question I think a CT scan as recommended by radiology would be appropriate and patient will follow up with me after the study.  I went over this with both him and his wife answered all of their questions.        The encounter diagnosis was Hip pain.                     This document has been electronically signed by John Nicole MD on January 18, 2023 13:37 CST

## 2023-01-20 ENCOUNTER — TRANSCRIBE ORDERS (OUTPATIENT)
Dept: GENERAL RADIOLOGY | Facility: HOSPITAL | Age: 58
End: 2023-01-20
Payer: OTHER MISCELLANEOUS

## 2023-01-20 DIAGNOSIS — M25.559 HIP PAIN: Primary | ICD-10-CM

## 2023-01-25 ENCOUNTER — HOSPITAL ENCOUNTER (OUTPATIENT)
Dept: CT IMAGING | Facility: HOSPITAL | Age: 58
Discharge: HOME | End: 2023-01-25
Payer: OTHER MISCELLANEOUS

## 2023-01-25 ENCOUNTER — LAB (OUTPATIENT)
Dept: LAB | Facility: HOSPITAL | Age: 58
End: 2023-01-25
Payer: OTHER MISCELLANEOUS

## 2023-01-25 DIAGNOSIS — M25.559 HIP PAIN: ICD-10-CM

## 2023-01-25 LAB
BUN SERPL-MCNC: 14 MG/DL (ref 6–20)
CREAT SERPL-MCNC: 0.92 MG/DL (ref 0.76–1.27)
EGFRCR SERPLBLD CKD-EPI 2021: 95.8 ML/MIN/1.73

## 2023-01-25 PROCEDURE — 36415 COLL VENOUS BLD VENIPUNCTURE: CPT

## 2023-01-25 PROCEDURE — 25010000002 IOPAMIDOL 61 % SOLUTION: Performed by: SURGERY

## 2023-01-25 PROCEDURE — 74177 CT ABD & PELVIS W/CONTRAST: CPT

## 2023-01-25 PROCEDURE — 82565 ASSAY OF CREATININE: CPT

## 2023-01-25 PROCEDURE — 84520 ASSAY OF UREA NITROGEN: CPT

## 2023-01-25 RX ADMIN — IOPAMIDOL 90 ML: 612 INJECTION, SOLUTION INTRAVENOUS at 13:47

## 2023-01-27 ENCOUNTER — OFFICE VISIT (OUTPATIENT)
Dept: SURGERY | Facility: CLINIC | Age: 58
End: 2023-01-27
Payer: OTHER MISCELLANEOUS

## 2023-01-27 VITALS
SYSTOLIC BLOOD PRESSURE: 133 MMHG | OXYGEN SATURATION: 99 % | TEMPERATURE: 98 F | BODY MASS INDEX: 33 KG/M2 | WEIGHT: 249 LBS | HEIGHT: 73 IN | DIASTOLIC BLOOD PRESSURE: 86 MMHG | HEART RATE: 65 BPM

## 2023-01-27 DIAGNOSIS — Z87.81 HISTORY OF PELVIC FRACTURE: Primary | ICD-10-CM

## 2023-01-27 PROCEDURE — 99212 OFFICE O/P EST SF 10 MIN: CPT | Performed by: SURGERY

## 2023-01-27 NOTE — PROGRESS NOTES
58-year-old male returns for CT results.  See previous note.  No obvious mass or abnormality to suggest lipoma was present.  Report is consistent with asymmetry related to previous pelvic fracture.  This was as discussed before with the patient.  I went over the CT with him and his wife answered all her questions.  There is no mass or tumor or any thing that needs to have any type of surgery at this point.  This is related to his history of pelvic fracture many years ago and subsequent healing.  All questions answered patient will follow up with us on a as needed basis

## 2023-03-07 ENCOUNTER — OFFICE VISIT (OUTPATIENT)
Dept: SLEEP MEDICINE | Facility: HOSPITAL | Age: 58
End: 2023-03-07
Payer: COMMERCIAL

## 2023-03-07 VITALS
BODY MASS INDEX: 33.11 KG/M2 | WEIGHT: 249.8 LBS | HEART RATE: 64 BPM | DIASTOLIC BLOOD PRESSURE: 90 MMHG | OXYGEN SATURATION: 97 % | SYSTOLIC BLOOD PRESSURE: 134 MMHG | HEIGHT: 73 IN

## 2023-03-07 DIAGNOSIS — G47.33 OBSTRUCTIVE SLEEP APNEA, ADULT: Primary | ICD-10-CM

## 2023-03-07 PROCEDURE — 99213 OFFICE O/P EST LOW 20 MIN: CPT | Performed by: NURSE PRACTITIONER

## 2023-03-07 RX ORDER — LANCETS
EACH MISCELLANEOUS
COMMUNITY
Start: 2023-02-28

## 2023-03-07 NOTE — PROGRESS NOTES
Sleep Clinic Follow Up    Date: 3/7/2023  Primary Care Provider: Angle Sanchez MD    Last office visit: 2022 (I reviewed this note)    CC: Follow up: RADHA on CPAP, annual       Interim History:  Since the last visit:    1) mild RADHA -  Ashwin Robles has remained compliant with CPAP. He denies mask and machine issues, dry mouth, headaches, or pressures intolerance. He denies abnormal dreams, sleep paralysis, nasal congestion, URI sx.  Has Dreamstation 2. Doing well no complaints.     2) Patient denies RLS symptoms.     Sleep Testin. PSG on 2012, AHI of 11.5   2. Currently on 10-20 cm H2O    PAP Data:    Time frame: 2022- 2023  Compliance: 100 %  Average use on days used: 7hrs 27 min  Percent of days with usage greater than or equal to 4 hours: 100%  PAP range: 10-20 cm H2O  Average 90% pressure: 11 cmH2O  Leak: 0 minutes  Average AHI: 1.5 events/hr  Mask type: nasal pillows   DME: [facility]     Bed time: 2200 or 0100  Sleep latency: 10 minutes  Number of times awakens during the night: 0-1  Wake time: 0500 or [id number]  Estimated total sleep time at night: 7-8 hours  Caffeine intake: 0 cups of coffee, 0 cups of tea, and 0 sodas per day  Alcohol intake: 0 drinks per week  Nap time: some days   Sleepiness with Driving: denies , does not drive       [place] - 5        PMHx, FH, SH reviewed and pertinent changes are: reports unchanged from last office visit on 2022      REVIEW OF SYSTEMS:   Negative for chest pain, SOA, fever, chills, cough, N/V/D, abdominal pain.    Smoking:none           Exam:  Vitals:    23 1615   BP: 134/90   Pulse: 64   SpO2: 97%           23  1615   Weight: 113 kg (249 lb 12.8 oz)     Body mass index is 32.96 kg/m². BMI is >= 30 and <35. (Class 1 Obesity). The following options were offered after discussion;: nutrition counseling/recommendations      Gen:                No distress, conversant, pleasant, appears stated age, alert,  oriented  Eyes:               Anicteric sclera, moist conjunctiva, no lid lag                           EOMI   Lungs:             normal effort, non-labored breathing                          Clear to auscultation bilaterally          CV:                  Normal S1/S2, no murmur                          no lower extremity edema                 Psych:             Appropriate affect  Neuro:             CN 2-12 appear intact    Past Medical History:   Diagnosis Date   • Anxiety    • Dysphagia    • Encounter for routine adult health examination    • Essential hypertension    • Hearing loss    • History of colonoscopy 05/28/2014    Internal & external hemorrhoids found.   • History of esophagogastroduodenoscopy 05/28/2014    EGD w/ tube 06951 (1) -  Esophagitis seen. Biopsy taken. Gastritis in stomach. Biopsy taken. Normal duodenum.   • History of nutritional disorder    • Hyperlipidemia    • Hypertensive disorder     will f/u    • Insomnia    • Nevus, non-neoplastic     Left side of his chin    • Obstructive sleep apnea of adult    • Osteoarthritis of multiple joints    • Pain in lower limb    • Routine general medical examination at a health care facility     annual well care exam   • Screening for malignant neoplasm of colon    • Tinnitus    • Vision impairment     Blindness AND/OR vision impairment level        Current Outpatient Medications:   •  Accu-Chek Softclix Lancets lancets, , Disp: , Rfl:   •  aspirin 81 MG tablet, Take 1 tablet by mouth Daily., Disp: , Rfl:   •  atorvastatin (LIPITOR) 10 MG tablet, Take 1 tablet by mouth Daily., Disp: , Rfl:   •  lisinopril (PRINIVIL,ZESTRIL) 20 MG tablet, Take 1 tablet by mouth Daily., Disp: , Rfl:   •  magnesium chloride ER 64 MG DR tablet, Take  by mouth Daily., Disp: , Rfl:   •  Omega 3-6-9 Fatty Acids (TRIPLE OMEGA COMPLEX PO), Take 1 tablet by mouth Daily., Disp: , Rfl:   •  tadalafil (CIALIS) 20 MG tablet, TAKE 1 TABLET BY MOUTH EVERY 72 HOURS AS NEEDED FOR ERECTILE  DYSFUNCTION, Disp: , Rfl:       Assessment and Plan:    1. Obstructive sleep apnea- Established, stable   1. Compliant with PAP therapy- compliance report reviewed with patient   2. Continue PAP as prescribed  3. Script for PAP supplies  4. Pertinent labs reviewed   5. Drowsy driving tips- do not drive if feeling sleepy   6. Return to clinic in 12 months with compliance report unless change in symptoms in interim period          I spent 15 minutes caring for Ashwin on this date of service. This time includes time spent by me in the following activities: preparing for the visit, obtaining and/or reviewing a separately obtained history, performing a medically appropriate examination and/or evaluation, counseling and educating the patient/family/caregiver, ordering medications, tests, or procedures and documenting information in the medical record. Educated on PAP management, maintenance, and compliance.           This document has been electronically signed by JOSEFINA Teixeira on March 7, 2023 16:25 CST            CC: Angle Sanchez MD          No ref. provider found

## (undated) DEVICE — CANN SMPL SOFTECH BIFLO ETCO2 A/M 7FT

## (undated) DEVICE — BITEBLOCK ENDO W/STRAP 60F A/ LF DISP